# Patient Record
Sex: FEMALE | Race: OTHER | NOT HISPANIC OR LATINO | Employment: UNEMPLOYED | URBAN - METROPOLITAN AREA
[De-identification: names, ages, dates, MRNs, and addresses within clinical notes are randomized per-mention and may not be internally consistent; named-entity substitution may affect disease eponyms.]

---

## 2022-03-22 DIAGNOSIS — Z86.39 HISTORY OF DIABETES MELLITUS, TYPE II: ICD-10-CM

## 2022-03-22 DIAGNOSIS — Z87.59 HISTORY OF PRE-ECLAMPSIA: ICD-10-CM

## 2022-03-22 DIAGNOSIS — Z86.018 HISTORY OF UTERINE FIBROID: Primary | ICD-10-CM

## 2022-04-28 ENCOUNTER — TELEPHONE (OUTPATIENT)
Dept: MATERNAL FETAL MEDICINE | Facility: CLINIC | Age: 27
End: 2022-04-28
Payer: COMMERCIAL

## 2022-04-28 NOTE — TELEPHONE ENCOUNTER
Attempted to contact number listed. Left vm to call back at  [facility], [place], [place].

## 2022-05-04 ENCOUNTER — PROCEDURE VISIT (OUTPATIENT)
Dept: MATERNAL FETAL MEDICINE | Facility: CLINIC | Age: 27
End: 2022-05-04
Payer: COMMERCIAL

## 2022-05-04 ENCOUNTER — OFFICE VISIT (OUTPATIENT)
Dept: MATERNAL FETAL MEDICINE | Facility: CLINIC | Age: 27
End: 2022-05-04
Payer: COMMERCIAL

## 2022-05-04 ENCOUNTER — PATIENT MESSAGE (OUTPATIENT)
Dept: MATERNAL FETAL MEDICINE | Facility: CLINIC | Age: 27
End: 2022-05-04

## 2022-05-04 VITALS
SYSTOLIC BLOOD PRESSURE: 116 MMHG | BODY MASS INDEX: 32.49 KG/M2 | HEIGHT: 64 IN | WEIGHT: 190.31 LBS | DIASTOLIC BLOOD PRESSURE: 66 MMHG

## 2022-05-04 DIAGNOSIS — Z86.018 HISTORY OF UTERINE FIBROID: ICD-10-CM

## 2022-05-04 DIAGNOSIS — Z86.39 HISTORY OF DIABETES MELLITUS, TYPE II: ICD-10-CM

## 2022-05-04 DIAGNOSIS — O34.10 UTERINE FIBROID IN PREGNANCY: ICD-10-CM

## 2022-05-04 DIAGNOSIS — Z87.59 HISTORY OF PRE-ECLAMPSIA: ICD-10-CM

## 2022-05-04 DIAGNOSIS — O24.111 PRE-EXISTING TYPE 2 DIABETES MELLITUS DURING PREGNANCY IN FIRST TRIMESTER: ICD-10-CM

## 2022-05-04 DIAGNOSIS — D25.9 UTERINE FIBROID IN PREGNANCY: ICD-10-CM

## 2022-05-04 DIAGNOSIS — Z86.39 HISTORY OF DIABETES MELLITUS, TYPE II: Primary | ICD-10-CM

## 2022-05-04 DIAGNOSIS — Z36.89 ENCOUNTER FOR FETAL ANATOMIC SURVEY: ICD-10-CM

## 2022-05-04 PROCEDURE — 76801 OB US < 14 WKS SINGLE FETUS: CPT | Mod: S$GLB,,, | Performed by: OBSTETRICS & GYNECOLOGY

## 2022-05-04 PROCEDURE — 3078F PR MOST RECENT DIASTOLIC BLOOD PRESSURE < 80 MM HG: ICD-10-PCS | Mod: S$GLB,,, | Performed by: OBSTETRICS & GYNECOLOGY

## 2022-05-04 PROCEDURE — 1160F PR REVIEW ALL MEDS BY PRESCRIBER/CLIN PHARMACIST DOCUMENTED: ICD-10-PCS | Mod: S$GLB,,, | Performed by: OBSTETRICS & GYNECOLOGY

## 2022-05-04 PROCEDURE — 1160F RVW MEDS BY RX/DR IN RCRD: CPT | Mod: S$GLB,,, | Performed by: OBSTETRICS & GYNECOLOGY

## 2022-05-04 PROCEDURE — 3074F PR MOST RECENT SYSTOLIC BLOOD PRESSURE < 130 MM HG: ICD-10-PCS | Mod: S$GLB,,, | Performed by: OBSTETRICS & GYNECOLOGY

## 2022-05-04 PROCEDURE — 76801 PR US, OB <14WKS, TRANSABD, SINGLE GESTATION: ICD-10-PCS | Mod: S$GLB,,, | Performed by: OBSTETRICS & GYNECOLOGY

## 2022-05-04 PROCEDURE — 3074F SYST BP LT 130 MM HG: CPT | Mod: S$GLB,,, | Performed by: OBSTETRICS & GYNECOLOGY

## 2022-05-04 PROCEDURE — 3078F DIAST BP <80 MM HG: CPT | Mod: S$GLB,,, | Performed by: OBSTETRICS & GYNECOLOGY

## 2022-05-04 PROCEDURE — 3008F BODY MASS INDEX DOCD: CPT | Mod: S$GLB,,, | Performed by: OBSTETRICS & GYNECOLOGY

## 2022-05-04 PROCEDURE — 99204 OFFICE O/P NEW MOD 45 MIN: CPT | Mod: TH,25,S$GLB, | Performed by: OBSTETRICS & GYNECOLOGY

## 2022-05-04 PROCEDURE — 3008F PR BODY MASS INDEX (BMI) DOCUMENTED: ICD-10-PCS | Mod: S$GLB,,, | Performed by: OBSTETRICS & GYNECOLOGY

## 2022-05-04 PROCEDURE — 99204 PR OFFICE/OUTPT VISIT, NEW, LEVL IV, 45-59 MIN: ICD-10-PCS | Mod: TH,25,S$GLB, | Performed by: OBSTETRICS & GYNECOLOGY

## 2022-05-04 PROCEDURE — 1159F MED LIST DOCD IN RCRD: CPT | Mod: S$GLB,,, | Performed by: OBSTETRICS & GYNECOLOGY

## 2022-05-04 PROCEDURE — 1159F PR MEDICATION LIST DOCUMENTED IN MEDICAL RECORD: ICD-10-PCS | Mod: S$GLB,,, | Performed by: OBSTETRICS & GYNECOLOGY

## 2022-05-04 RX ORDER — INSULIN GLARGINE 100 [IU]/ML
INJECTION, SOLUTION SUBCUTANEOUS
COMMUNITY
Start: 2021-11-05 | End: 2022-06-09 | Stop reason: ALTCHOICE

## 2022-05-04 RX ORDER — ASPIRIN 81 MG/1
81 TABLET ORAL DAILY
COMMUNITY

## 2022-05-04 RX ORDER — INSULIN ASPART 100 [IU]/ML
INJECTION, SOLUTION INTRAVENOUS; SUBCUTANEOUS
COMMUNITY
Start: 2021-10-06 | End: 2022-06-29 | Stop reason: SDUPTHER

## 2022-05-04 RX ORDER — METFORMIN HYDROCHLORIDE 1000 MG/1
1000 TABLET ORAL 2 TIMES DAILY
COMMUNITY
Start: 2021-11-03 | End: 2022-09-14

## 2022-05-04 NOTE — PROGRESS NOTES
Chief complaint: Diabetes complicating pregnancy, fibroids    Provider requesting consultation: Dr. Guillory    26 y.o. [id number] 11w5d EGA.    PMH:  Past Medical History:   Diagnosis Date    Type 2 diabetes mellitus, without long-term current use of insulin     Unspecified pre-eclampsia, unspecified trimester        PObHx:  OB History    Para Term  AB Living   2 1 1     1   SAB IAB Ectopic Multiple Live Births           1      # Outcome Date GA Lbr Andrade/2nd Weight Sex Delivery Anes PTL Lv   2 Current            1 Term 04/15/19 37w0d   M CS-Unspec   KARMEN      Complications: Fetal Intolerance       PSH:  Past Surgical History:   Procedure Laterality Date     SECTION         Family history:family history is not on file.    Social history: reports that she has never smoked. She has never used smokeless tobacco. She reports previous alcohol use. She reports that she does not use drugs.    A detailed fetal  ultrasound was completed today.  See details in imaging section of EPIC.    Pregestational Diabetes  The patient was counseled regarding the risks of diabetes in pregnancy. These risks include but are not limited to an increased risk of , preeclampsia/gestational hypertension, fetal structural anomalies, macrosomia, prematurity, shoulder dystocia/birth injury,  hyperbilirubinemia and electrolyte issues, pulmonary immaturity and sudden stillbirth especially in those patients with poor glucose control. I also discussed with the patient the need for increased fetal surveillance with serial fetal growth assessments and third trimester fetal testing. I also reviewed the possibility of need for early delivery in those with poor glucose control or evidence of fetal growth abnormalities.    Recommendations (Please refer to [facility] guidelines):   Baseline evaluation (to be ordered by primary OB provider):  o 24 hour urine protein or urine P/C ratio, CBC, CMP  o Maternal EKG;  echocardiogram if BMI > 30 or EKG is abnormal  o Maternal ophthalmic exam (if hasn't been performed in last year) and podiatry exam  o TSH (if type 1 DM)  o Hemoglobin A1C every trimester  o Diabetic education referral and counseling re: goal blood sugars (< 95 mg/dl for fasting, < 120 mg/dl for 2 hour postprandial)   Medications:   o Start low dose aspirin 81 mg daily at 12-16 weeks for preeclampsia risk reduction  o 1 mg folic acid daily  o Insulin/Metformin-see below   Multiple dose injectable insulin  o Patient's current regimen is:  - Metformin 1000 mg bid  - NPH/Levemir/glargine/basaglar 30 units Q AM, 15 units Q PM.  - Aspart/Lispro none  o After consultation, as she did not bring a glucose log, no adjustments were made:   Ultrasounds:  o Nuchal translucency scan at 12-13 weeks  o Targeted anatomy survey at 20 weeks  o Serial growth ultrasounds q 4-6 weeks at 26-28 weeks  o A fetal echocardiogram is recommended at 22-24 weeks with pediatric cardiology   Prenatal testing  o Twice weekly BPP/ NST + AFV starting at 32 weeks. (Should be ordered and arranged by the patient's primary OB provider).     Delivery timing:  o 38 0/7 to 38 and 6/7 weeks if under good control without comorbidities  o 37 0/7 to 37 and 67 weeks if longstanding diabetes or poorly controlled, polyhydramnios, EFW>90th percentile, or BMI >= 40  o 36 0/7 to 37 and 6/7 weeks if vascular complications, prior stillbirth or other complicating conditions.  Recommend consideration of earlier delivery if IUGR, HTN, or other complications  Recommend offering  for delivery is EFW is 4500g or more near the time of delivery     Insulin management during labor and delivery  o Give usual dose of intermediate acting (NPH) or long-acting insulin at bedtime  o Hold morning dose of insulin or reduce to ½ dose   o Patients who require insulin should be scheduled as the first available (7 am or 7:30 am)  given the need for NPO  status  o Check glucose every 2-4 hours in latent labor; hourly in active labor  o If blood glucose is less than 70 mg/dl, change IVF to D5 ½ NS at rate of 100-150 cc/hr and monitor blood glucose hourly   o IV infusion of regular insulin is recommended if glucose levels exceed 120 mg/dl  o Type 1 diabetics require an infusion of glucose (D5 ½ NS) and insulin to avoid ketosis and hypoglycemia; the insulin infusion can be temporarily paused if hypoglycemia is noted and additional dextrose (D10 or ½ amp of D50) can be administered  o Patients who are well controlled with an insulin pump can continue during labor and delivery. Recommend primary OB ensure pump site is out of operative field and confirm pump is compatible with and able to be left on during surgery.   Postpartum management  o Insulin should be reduced by 1/2 of the pre-delivery dose within the first 6-24 hours following delivery.  o Patients who were well-controlled on oral regimens can often return to these following delivery.  o Patients who are breastfeeding should be advised to have small snacks before breastfeeding to reduce the risk of hypoglycemia.  o Patients should be referred to primary MD or Endocrinology for postpartum management.    The patient was counseled about fibroids in pregnancy.  The patient was counseled that fibroids may remain stable or increase in size.  The patient was counseled that some fibroids may undergo degeneration and cause severe abdominal pain and require hospitalization for pain control.  The patient was counseled that large fibroids may be associated with obstruction to labor or fetal malpresentation.  The patient was counseled that retroplacental fibroids may be associated with fetal growth restriction (FGR).  I recommend that the patient have serial fetal growth ultrasounds every 4 weeks, starting at approximately 28-32 weeks.     The patient was given an opportunity to ask questions about management and the diease  process.  She expressed an understanding of and agreement to the above impression and plan. All questions were answered to her satisfaction.  She was given contact information to the [facility] clinic to address further concerns.      I spent a total of 30 minutes on the day of the visit. This includes face to face time and non-face to face time preparing to see the patient (eg, review of tests), Obtaining and/or reviewing separately obtained history, Documenting clinical information in the electronic or other health record, Independently interpreting results and communicating results to the patient/family/caregiver, or Care coordination.

## 2022-05-05 ENCOUNTER — TELEPHONE (OUTPATIENT)
Dept: DIABETES | Facility: CLINIC | Age: 27
End: 2022-05-05
Payer: COMMERCIAL

## 2022-05-11 ENCOUNTER — PATIENT MESSAGE (OUTPATIENT)
Dept: DIABETES | Facility: CLINIC | Age: 27
End: 2022-05-11
Payer: COMMERCIAL

## 2022-05-12 ENCOUNTER — TELEPHONE (OUTPATIENT)
Dept: MATERNAL FETAL MEDICINE | Facility: CLINIC | Age: 27
End: 2022-05-12
Payer: COMMERCIAL

## 2022-05-12 NOTE — TELEPHONE ENCOUNTER
Patient called yesterday to reschedule appt to next Wednesday. Requested pt to send in recent blood sugar log through patient portal.

## 2022-05-18 ENCOUNTER — LAB VISIT (OUTPATIENT)
Dept: LAB | Facility: CLINIC | Age: 27
End: 2022-05-18
Payer: MEDICAID

## 2022-05-18 ENCOUNTER — OFFICE VISIT (OUTPATIENT)
Dept: MATERNAL FETAL MEDICINE | Facility: CLINIC | Age: 27
End: 2022-05-18
Payer: MEDICAID

## 2022-05-18 VITALS
BODY MASS INDEX: 33.15 KG/M2 | WEIGHT: 193.13 LBS | DIASTOLIC BLOOD PRESSURE: 64 MMHG | SYSTOLIC BLOOD PRESSURE: 124 MMHG

## 2022-05-18 DIAGNOSIS — O24.111 PRE-EXISTING TYPE 2 DIABETES MELLITUS DURING PREGNANCY IN FIRST TRIMESTER: Primary | ICD-10-CM

## 2022-05-18 DIAGNOSIS — O24.111 PRE-EXISTING TYPE 2 DIABETES MELLITUS DURING PREGNANCY IN FIRST TRIMESTER: ICD-10-CM

## 2022-05-18 LAB
ESTIMATED AVG GLUCOSE: 131 MG/DL (ref 68–131)
HBA1C MFR BLD: 6.2 % (ref 4–5.6)

## 2022-05-18 PROCEDURE — 36415 PR COLLECTION VENOUS BLOOD,VENIPUNCTURE: ICD-10-PCS | Mod: ,,, | Performed by: OBSTETRICS & GYNECOLOGY

## 2022-05-18 PROCEDURE — 3008F PR BODY MASS INDEX (BMI) DOCUMENTED: ICD-10-PCS | Mod: S$GLB,,, | Performed by: OBSTETRICS & GYNECOLOGY

## 2022-05-18 PROCEDURE — 3008F BODY MASS INDEX DOCD: CPT | Mod: S$GLB,,, | Performed by: OBSTETRICS & GYNECOLOGY

## 2022-05-18 PROCEDURE — 99213 PR OFFICE/OUTPT VISIT, EST, LEVL III, 20-29 MIN: ICD-10-PCS | Mod: TH,S$GLB,, | Performed by: OBSTETRICS & GYNECOLOGY

## 2022-05-18 PROCEDURE — 1159F PR MEDICATION LIST DOCUMENTED IN MEDICAL RECORD: ICD-10-PCS | Mod: S$GLB,,, | Performed by: OBSTETRICS & GYNECOLOGY

## 2022-05-18 PROCEDURE — 1159F MED LIST DOCD IN RCRD: CPT | Mod: S$GLB,,, | Performed by: OBSTETRICS & GYNECOLOGY

## 2022-05-18 PROCEDURE — 1160F RVW MEDS BY RX/DR IN RCRD: CPT | Mod: S$GLB,,, | Performed by: OBSTETRICS & GYNECOLOGY

## 2022-05-18 PROCEDURE — 99213 OFFICE O/P EST LOW 20 MIN: CPT | Mod: TH,S$GLB,, | Performed by: OBSTETRICS & GYNECOLOGY

## 2022-05-18 PROCEDURE — 3078F PR MOST RECENT DIASTOLIC BLOOD PRESSURE < 80 MM HG: ICD-10-PCS | Mod: S$GLB,,, | Performed by: OBSTETRICS & GYNECOLOGY

## 2022-05-18 PROCEDURE — 3074F PR MOST RECENT SYSTOLIC BLOOD PRESSURE < 130 MM HG: ICD-10-PCS | Mod: S$GLB,,, | Performed by: OBSTETRICS & GYNECOLOGY

## 2022-05-18 PROCEDURE — 36415 COLL VENOUS BLD VENIPUNCTURE: CPT | Mod: ,,, | Performed by: OBSTETRICS & GYNECOLOGY

## 2022-05-18 PROCEDURE — 83036 HEMOGLOBIN GLYCOSYLATED A1C: CPT | Performed by: OBSTETRICS & GYNECOLOGY

## 2022-05-18 PROCEDURE — 3078F DIAST BP <80 MM HG: CPT | Mod: S$GLB,,, | Performed by: OBSTETRICS & GYNECOLOGY

## 2022-05-18 PROCEDURE — 3074F SYST BP LT 130 MM HG: CPT | Mod: S$GLB,,, | Performed by: OBSTETRICS & GYNECOLOGY

## 2022-05-18 PROCEDURE — 1160F PR REVIEW ALL MEDS BY PRESCRIBER/CLIN PHARMACIST DOCUMENTED: ICD-10-PCS | Mod: S$GLB,,, | Performed by: OBSTETRICS & GYNECOLOGY

## 2022-05-18 NOTE — PROGRESS NOTES
Follow up consultation regarding diabetes in pregnancy provided today.  Risks of uncontrolled diabetes in pregnancy reviewed once again.  Blood glucose measurements assessed.  The patient has not recorded any post dinner sugars.  All of her other values are well within target range.  I did not adjust her medications today.  She will send sugars through the portal next week and I ordered a hemoglobin A1c on today's visit.    The patient was given an opportunity to ask questions about management and the diease process.  She expressed an understanding of and agreement to the above impression and plan. All questions were answered to her satisfaction.  She was given contact information to the [facility] clinic to address further concerns.      I spent a total of 20 minutes on the day of the visit. This includes face to face time and non-face to face time preparing to see the patient (eg, review of tests), Obtaining and/or reviewing separately obtained history, Documenting clinical information in the electronic or other health record, Independently interpreting results and communicating results to the patient/family/caregiver, or Care coordination.

## 2022-05-27 ENCOUNTER — TELEPHONE (OUTPATIENT)
Dept: PEDIATRIC CARDIOLOGY | Facility: CLINIC | Age: 27
End: 2022-05-27
Payer: COMMERCIAL

## 2022-05-27 ENCOUNTER — PATIENT MESSAGE (OUTPATIENT)
Dept: PEDIATRIC CARDIOLOGY | Facility: CLINIC | Age: 27
End: 2022-05-27
Payer: COMMERCIAL

## 2022-05-27 NOTE — TELEPHONE ENCOUNTER
Left message for patient to call clinic about having to move the fetal echo due to a clinic closer in [place]

## 2022-06-08 ENCOUNTER — OFFICE VISIT (OUTPATIENT)
Dept: MATERNAL FETAL MEDICINE | Facility: CLINIC | Age: 27
End: 2022-06-08
Payer: MEDICAID

## 2022-06-08 VITALS
SYSTOLIC BLOOD PRESSURE: 134 MMHG | HEIGHT: 64 IN | DIASTOLIC BLOOD PRESSURE: 93 MMHG | WEIGHT: 200.63 LBS | BODY MASS INDEX: 34.25 KG/M2

## 2022-06-08 DIAGNOSIS — O24.112 PRE-EXISTING TYPE 2 DIABETES MELLITUS DURING PREGNANCY IN SECOND TRIMESTER: Primary | ICD-10-CM

## 2022-06-08 PROCEDURE — 1159F PR MEDICATION LIST DOCUMENTED IN MEDICAL RECORD: ICD-10-PCS | Mod: S$GLB,,, | Performed by: OBSTETRICS & GYNECOLOGY

## 2022-06-08 PROCEDURE — 3075F SYST BP GE 130 - 139MM HG: CPT | Mod: S$GLB,,, | Performed by: OBSTETRICS & GYNECOLOGY

## 2022-06-08 PROCEDURE — 3080F PR MOST RECENT DIASTOLIC BLOOD PRESSURE >= 90 MM HG: ICD-10-PCS | Mod: S$GLB,,, | Performed by: OBSTETRICS & GYNECOLOGY

## 2022-06-08 PROCEDURE — 3008F BODY MASS INDEX DOCD: CPT | Mod: S$GLB,,, | Performed by: OBSTETRICS & GYNECOLOGY

## 2022-06-08 PROCEDURE — 3044F HG A1C LEVEL LT 7.0%: CPT | Mod: S$GLB,,, | Performed by: OBSTETRICS & GYNECOLOGY

## 2022-06-08 PROCEDURE — 3080F DIAST BP >= 90 MM HG: CPT | Mod: S$GLB,,, | Performed by: OBSTETRICS & GYNECOLOGY

## 2022-06-08 PROCEDURE — 3044F PR MOST RECENT HEMOGLOBIN A1C LEVEL <7.0%: ICD-10-PCS | Mod: S$GLB,,, | Performed by: OBSTETRICS & GYNECOLOGY

## 2022-06-08 PROCEDURE — 3075F PR MOST RECENT SYSTOLIC BLOOD PRESS GE 130-139MM HG: ICD-10-PCS | Mod: S$GLB,,, | Performed by: OBSTETRICS & GYNECOLOGY

## 2022-06-08 PROCEDURE — 1159F MED LIST DOCD IN RCRD: CPT | Mod: S$GLB,,, | Performed by: OBSTETRICS & GYNECOLOGY

## 2022-06-08 PROCEDURE — 99213 PR OFFICE/OUTPT VISIT, EST, LEVL III, 20-29 MIN: ICD-10-PCS | Mod: TH,S$GLB,, | Performed by: OBSTETRICS & GYNECOLOGY

## 2022-06-08 PROCEDURE — 99213 OFFICE O/P EST LOW 20 MIN: CPT | Mod: TH,S$GLB,, | Performed by: OBSTETRICS & GYNECOLOGY

## 2022-06-08 PROCEDURE — 3008F PR BODY MASS INDEX (BMI) DOCUMENTED: ICD-10-PCS | Mod: S$GLB,,, | Performed by: OBSTETRICS & GYNECOLOGY

## 2022-06-08 RX ORDER — HUMAN INSULIN 100 [IU]/ML
INJECTION, SUSPENSION SUBCUTANEOUS
Qty: 14.1 ML | Refills: 0 | Status: CANCELLED | OUTPATIENT
Start: 2022-06-08 | End: 2022-07-08

## 2022-06-08 NOTE — PROGRESS NOTES
"Maternal Fetal Medicine follow up consult    SUBJECTIVE:     Lucy Puckett is a 26 y.o.  female with IUP at 16w5 dwho is seen in follow up consultation by MFM.  Pregnancy complications include:   Type II Diabetes in Pregnancy  Previous notes reviewed.   No changes to medical, surgical, family, social, or obstetric history.    Interval history since last MFM visit: denies complications     Medications:  Current Outpatient Medications   Medication Instructions    aspirin (ECOTRIN) 81 mg, Oral, Daily    insulin (LANTUS SOLOSTAR U-100 INSULIN) glargine 100 units/mL (3mL) SubQ pen Takes 30 units in am    insulin aspart U-100 (NOVOLOG) 100 unit/mL injection Takes 15 units at lunch    metFORMIN (GLUCOPHAGE) 1,000 mg, Oral, 2 times daily       Care team members:  Dr Guillory - Primary OB  MFM     OBJECTIVE:   BP (!) 134/93 (BP Location: Right arm, Patient Position: Sitting, BP Method: Medium (Automatic))   Ht 5' 4" (1.626 m)   Wt 91 kg (200 lb 9.9 oz)   BMI 34.44 kg/m²   Physical Exam   FHTs 140s with doppler           ASSESSMENT/PLAN:     26 y.o.  female with IUP at 16w5d    PreGest DM  BS check  PBS reviewed.  FBS ,  pBF 116-117 , pL 120 (single value)  Patient is not checking BS 4X daily.  Reviewed BS goals and need for checking both FBS and PP  Current Meds: NPH 30 units AM, 15 units PM  Recommended adjustment:  NPH 30 AM, 17 PM                                                 Continue metformin 1000 mg BID     Patient to send BS in every week via Portal  Has return appointment with us 22.  Discussed importance of compliance with therapy and with PBS checks.   Patient left clinic before BP was rechecked; we called to ask that she have it repeated in next 1-3 days at her pharmacy.       20 minutes of total time spent on the encounter, which includes face to face time and non-face to face time preparing to see the patient (eg, review of tests), obtaining and/or reviewing separately obtained " history, documenting clinical information in the electronic or other health record, independently interpreting results (not separately reported) and communicating results to the patient/family/caregiver, or care coordination (not separately reported).

## 2022-06-09 RX ORDER — INSULIN HUMAN 100 [IU]/ML
INJECTION, SUSPENSION SUBCUTANEOUS
Qty: 10 ML | Refills: 3 | Status: SHIPPED | OUTPATIENT
Start: 2022-06-09 | End: 2024-02-21 | Stop reason: ALTCHOICE

## 2022-06-29 ENCOUNTER — OFFICE VISIT (OUTPATIENT)
Dept: MATERNAL FETAL MEDICINE | Facility: CLINIC | Age: 27
End: 2022-06-29
Payer: COMMERCIAL

## 2022-06-29 ENCOUNTER — PATIENT MESSAGE (OUTPATIENT)
Dept: MATERNAL FETAL MEDICINE | Facility: CLINIC | Age: 27
End: 2022-06-29

## 2022-06-29 ENCOUNTER — PROCEDURE VISIT (OUTPATIENT)
Dept: MATERNAL FETAL MEDICINE | Facility: CLINIC | Age: 27
End: 2022-06-29
Payer: COMMERCIAL

## 2022-06-29 VITALS
BODY MASS INDEX: 35.02 KG/M2 | SYSTOLIC BLOOD PRESSURE: 131 MMHG | WEIGHT: 204.06 LBS | DIASTOLIC BLOOD PRESSURE: 72 MMHG

## 2022-06-29 DIAGNOSIS — Z36.89 ENCOUNTER FOR FETAL ANATOMIC SURVEY: ICD-10-CM

## 2022-06-29 DIAGNOSIS — Z36.89 ENCOUNTER FOR ULTRASOUND TO ASSESS FETAL GROWTH: Primary | ICD-10-CM

## 2022-06-29 DIAGNOSIS — Z86.39 HISTORY OF DIABETES MELLITUS, TYPE II: Primary | ICD-10-CM

## 2022-06-29 DIAGNOSIS — Z86.39 HISTORY OF DIABETES MELLITUS, TYPE II: ICD-10-CM

## 2022-06-29 PROCEDURE — 3044F PR MOST RECENT HEMOGLOBIN A1C LEVEL <7.0%: ICD-10-PCS | Mod: S$GLB,,, | Performed by: OBSTETRICS & GYNECOLOGY

## 2022-06-29 PROCEDURE — 1159F MED LIST DOCD IN RCRD: CPT | Mod: S$GLB,,, | Performed by: OBSTETRICS & GYNECOLOGY

## 2022-06-29 PROCEDURE — 1159F PR MEDICATION LIST DOCUMENTED IN MEDICAL RECORD: ICD-10-PCS | Mod: S$GLB,,, | Performed by: OBSTETRICS & GYNECOLOGY

## 2022-06-29 PROCEDURE — 99215 PR OFFICE/OUTPT VISIT, EST, LEVL V, 40-54 MIN: ICD-10-PCS | Mod: TH,25,S$GLB, | Performed by: OBSTETRICS & GYNECOLOGY

## 2022-06-29 PROCEDURE — 3008F PR BODY MASS INDEX (BMI) DOCUMENTED: ICD-10-PCS | Mod: S$GLB,,, | Performed by: OBSTETRICS & GYNECOLOGY

## 2022-06-29 PROCEDURE — 3075F PR MOST RECENT SYSTOLIC BLOOD PRESS GE 130-139MM HG: ICD-10-PCS | Mod: S$GLB,,, | Performed by: OBSTETRICS & GYNECOLOGY

## 2022-06-29 PROCEDURE — 3044F HG A1C LEVEL LT 7.0%: CPT | Mod: S$GLB,,, | Performed by: OBSTETRICS & GYNECOLOGY

## 2022-06-29 PROCEDURE — 3075F SYST BP GE 130 - 139MM HG: CPT | Mod: S$GLB,,, | Performed by: OBSTETRICS & GYNECOLOGY

## 2022-06-29 PROCEDURE — 76811 OB US DETAILED SNGL FETUS: CPT | Mod: S$GLB,,, | Performed by: OBSTETRICS & GYNECOLOGY

## 2022-06-29 PROCEDURE — 1160F PR REVIEW ALL MEDS BY PRESCRIBER/CLIN PHARMACIST DOCUMENTED: ICD-10-PCS | Mod: S$GLB,,, | Performed by: OBSTETRICS & GYNECOLOGY

## 2022-06-29 PROCEDURE — 76811 PR US, OB FETAL EVAL & EXAM, TRANSABDOM,FIRST GESTATION: ICD-10-PCS | Mod: S$GLB,,, | Performed by: OBSTETRICS & GYNECOLOGY

## 2022-06-29 PROCEDURE — 3008F BODY MASS INDEX DOCD: CPT | Mod: S$GLB,,, | Performed by: OBSTETRICS & GYNECOLOGY

## 2022-06-29 PROCEDURE — 99215 OFFICE O/P EST HI 40 MIN: CPT | Mod: TH,25,S$GLB, | Performed by: OBSTETRICS & GYNECOLOGY

## 2022-06-29 PROCEDURE — 1160F RVW MEDS BY RX/DR IN RCRD: CPT | Mod: S$GLB,,, | Performed by: OBSTETRICS & GYNECOLOGY

## 2022-06-29 PROCEDURE — 3078F DIAST BP <80 MM HG: CPT | Mod: S$GLB,,, | Performed by: OBSTETRICS & GYNECOLOGY

## 2022-06-29 PROCEDURE — 3078F PR MOST RECENT DIASTOLIC BLOOD PRESSURE < 80 MM HG: ICD-10-PCS | Mod: S$GLB,,, | Performed by: OBSTETRICS & GYNECOLOGY

## 2022-06-29 RX ORDER — INSULIN ASPART 100 [IU]/ML
30 INJECTION, SOLUTION INTRAVENOUS; SUBCUTANEOUS 3 TIMES DAILY
Qty: 30 ML | Refills: 11 | Status: SHIPPED | OUTPATIENT
Start: 2022-06-29 | End: 2024-02-21

## 2022-06-29 NOTE — PROGRESS NOTES
Follow up consultation regarding diabetes in pregnancy provided today.  Risks of uncontrolled diabetes in pregnancy reviewed once again.  Blood glucose measurements assessed.  Her insulin regimen is completely messed up.  She thought that she was on novel in 30 units in the morning and 17 units at lunch.  When questioned she states that the bottle is clear.  She showed me a bottle of novel an end that she was originally on which I could see was cloudy but said that the pharmacy was out of that and gave her the clear.  She also takes metformin a 1000 mg twice daily.  She will send us a picture of the bottle that she is presently taking, this is either novolin R or NovoLog.  I will continue her short-acting insulin and change her to 30 units with breakfast, 26 units with lunch and 26 units with dinner.  Since her fastings are in range I will not change her metformin and not add a long-acting insulin at bedtime.  This is been rather confusing and took a while to actually figure out.    The patient was given an opportunity to ask questions about management and the diease process.  She expressed an understanding of and agreement to the above impression and plan. All questions were answered to her satisfaction.  She was given contact information to the [facility] clinic to address further concerns.      I spent a total of 40 minutes on the day of the visit. This includes face to face time and non-face to face time preparing to see the patient (eg, review of tests), Obtaining and/or reviewing separately obtained history, Documenting clinical information in the electronic or other health record, Independently interpreting results and communicating results to the patient/family/caregiver, or Care coordination.

## 2022-07-13 ENCOUNTER — PATIENT MESSAGE (OUTPATIENT)
Dept: MATERNAL FETAL MEDICINE | Facility: CLINIC | Age: 27
End: 2022-07-13

## 2022-07-13 ENCOUNTER — DOCUMENTATION ONLY (OUTPATIENT)
Dept: MATERNAL FETAL MEDICINE | Facility: CLINIC | Age: 27
End: 2022-07-13
Payer: COMMERCIAL

## 2022-07-13 NOTE — PROGRESS NOTES
Blood glucose log reviewed from 6/30-7/13. Plan to titrate lunch time aspart to 28 units due to mildly elevated lunch post prandial values.           Fasting: elevated 3/14  PP Breakfast: elevated 1/13  PP Lunch: elevated 11/13  PP Dinner: elevated 6/13    Current regimen:   -Aspart 30/26/26  -Metformin 1000 mg BID    New regimen:  -Aspart 30/28/26  -Metformin 1000 mg BID    MG Thalia  PGY5  Maternal Fetal Medicine

## 2022-07-20 ENCOUNTER — OFFICE VISIT (OUTPATIENT)
Dept: MATERNAL FETAL MEDICINE | Facility: CLINIC | Age: 27
End: 2022-07-20
Payer: COMMERCIAL

## 2022-07-20 VITALS
DIASTOLIC BLOOD PRESSURE: 72 MMHG | BODY MASS INDEX: 33.89 KG/M2 | SYSTOLIC BLOOD PRESSURE: 126 MMHG | WEIGHT: 197.44 LBS

## 2022-07-20 DIAGNOSIS — O24.112 PRE-EXISTING TYPE 2 DIABETES MELLITUS DURING PREGNANCY IN SECOND TRIMESTER: Primary | ICD-10-CM

## 2022-07-20 PROCEDURE — 3008F BODY MASS INDEX DOCD: CPT | Mod: S$GLB,,, | Performed by: OBSTETRICS & GYNECOLOGY

## 2022-07-20 PROCEDURE — 3074F PR MOST RECENT SYSTOLIC BLOOD PRESSURE < 130 MM HG: ICD-10-PCS | Mod: S$GLB,,, | Performed by: OBSTETRICS & GYNECOLOGY

## 2022-07-20 PROCEDURE — 1160F PR REVIEW ALL MEDS BY PRESCRIBER/CLIN PHARMACIST DOCUMENTED: ICD-10-PCS | Mod: S$GLB,,, | Performed by: OBSTETRICS & GYNECOLOGY

## 2022-07-20 PROCEDURE — 99213 PR OFFICE/OUTPT VISIT, EST, LEVL III, 20-29 MIN: ICD-10-PCS | Mod: TH,S$GLB,, | Performed by: OBSTETRICS & GYNECOLOGY

## 2022-07-20 PROCEDURE — 1160F RVW MEDS BY RX/DR IN RCRD: CPT | Mod: S$GLB,,, | Performed by: OBSTETRICS & GYNECOLOGY

## 2022-07-20 PROCEDURE — 99213 OFFICE O/P EST LOW 20 MIN: CPT | Mod: TH,S$GLB,, | Performed by: OBSTETRICS & GYNECOLOGY

## 2022-07-20 PROCEDURE — 3074F SYST BP LT 130 MM HG: CPT | Mod: S$GLB,,, | Performed by: OBSTETRICS & GYNECOLOGY

## 2022-07-20 PROCEDURE — 3044F HG A1C LEVEL LT 7.0%: CPT | Mod: S$GLB,,, | Performed by: OBSTETRICS & GYNECOLOGY

## 2022-07-20 PROCEDURE — 3078F PR MOST RECENT DIASTOLIC BLOOD PRESSURE < 80 MM HG: ICD-10-PCS | Mod: S$GLB,,, | Performed by: OBSTETRICS & GYNECOLOGY

## 2022-07-20 PROCEDURE — 3044F PR MOST RECENT HEMOGLOBIN A1C LEVEL <7.0%: ICD-10-PCS | Mod: S$GLB,,, | Performed by: OBSTETRICS & GYNECOLOGY

## 2022-07-20 PROCEDURE — 3008F PR BODY MASS INDEX (BMI) DOCUMENTED: ICD-10-PCS | Mod: S$GLB,,, | Performed by: OBSTETRICS & GYNECOLOGY

## 2022-07-20 PROCEDURE — 1159F PR MEDICATION LIST DOCUMENTED IN MEDICAL RECORD: ICD-10-PCS | Mod: S$GLB,,, | Performed by: OBSTETRICS & GYNECOLOGY

## 2022-07-20 PROCEDURE — 3078F DIAST BP <80 MM HG: CPT | Mod: S$GLB,,, | Performed by: OBSTETRICS & GYNECOLOGY

## 2022-07-20 PROCEDURE — 1159F MED LIST DOCD IN RCRD: CPT | Mod: S$GLB,,, | Performed by: OBSTETRICS & GYNECOLOGY

## 2022-07-20 NOTE — PROGRESS NOTES
Follow up consultation regarding diabetes in pregnancy provided today.  Risks of uncontrolled diabetes in pregnancy reviewed once again.  Blood glucose measurements assessed. She is under better control.  Her lunches are elevated  >50%.  I did adjust her medications today.  I changed her Novolog to 30 units with each meal.    The patient was given an opportunity to ask questions about management and the diease process.  She expressed an understanding of and agreement to the above impression and plan. All questions were answered to her satisfaction.  She was given contact information to the [facility] clinic to address further concerns.      I spent a total of 20 minutes on the day of the visit. This includes face to face time and non-face to face time preparing to see the patient (eg, review of tests), Obtaining and/or reviewing separately obtained history, Documenting clinical information in the electronic or other health record, Independently interpreting results and communicating results to the patient/family/caregiver, or Care coordination.

## 2022-08-08 ENCOUNTER — PATIENT MESSAGE (OUTPATIENT)
Dept: MATERNAL FETAL MEDICINE | Facility: CLINIC | Age: 27
End: 2022-08-08
Payer: COMMERCIAL

## 2022-08-10 ENCOUNTER — OFFICE VISIT (OUTPATIENT)
Dept: MATERNAL FETAL MEDICINE | Facility: CLINIC | Age: 27
End: 2022-08-10
Payer: MEDICAID

## 2022-08-10 VITALS
SYSTOLIC BLOOD PRESSURE: 118 MMHG | DIASTOLIC BLOOD PRESSURE: 55 MMHG | BODY MASS INDEX: 34.87 KG/M2 | WEIGHT: 203.13 LBS

## 2022-08-10 DIAGNOSIS — O24.112 PRE-EXISTING TYPE 2 DIABETES MELLITUS DURING PREGNANCY IN SECOND TRIMESTER: Primary | ICD-10-CM

## 2022-08-10 PROCEDURE — 99214 PR OFFICE/OUTPT VISIT, EST, LEVL IV, 30-39 MIN: ICD-10-PCS | Mod: TH,S$GLB,, | Performed by: OBSTETRICS & GYNECOLOGY

## 2022-08-10 PROCEDURE — 3044F HG A1C LEVEL LT 7.0%: CPT | Mod: S$GLB,,, | Performed by: OBSTETRICS & GYNECOLOGY

## 2022-08-10 PROCEDURE — 3008F BODY MASS INDEX DOCD: CPT | Mod: S$GLB,,, | Performed by: OBSTETRICS & GYNECOLOGY

## 2022-08-10 PROCEDURE — 1159F PR MEDICATION LIST DOCUMENTED IN MEDICAL RECORD: ICD-10-PCS | Mod: S$GLB,,, | Performed by: OBSTETRICS & GYNECOLOGY

## 2022-08-10 PROCEDURE — 3074F PR MOST RECENT SYSTOLIC BLOOD PRESSURE < 130 MM HG: ICD-10-PCS | Mod: S$GLB,,, | Performed by: OBSTETRICS & GYNECOLOGY

## 2022-08-10 PROCEDURE — 3044F PR MOST RECENT HEMOGLOBIN A1C LEVEL <7.0%: ICD-10-PCS | Mod: S$GLB,,, | Performed by: OBSTETRICS & GYNECOLOGY

## 2022-08-10 PROCEDURE — 3074F SYST BP LT 130 MM HG: CPT | Mod: S$GLB,,, | Performed by: OBSTETRICS & GYNECOLOGY

## 2022-08-10 PROCEDURE — 99214 OFFICE O/P EST MOD 30 MIN: CPT | Mod: TH,S$GLB,, | Performed by: OBSTETRICS & GYNECOLOGY

## 2022-08-10 PROCEDURE — 1159F MED LIST DOCD IN RCRD: CPT | Mod: S$GLB,,, | Performed by: OBSTETRICS & GYNECOLOGY

## 2022-08-10 PROCEDURE — 3078F DIAST BP <80 MM HG: CPT | Mod: S$GLB,,, | Performed by: OBSTETRICS & GYNECOLOGY

## 2022-08-10 PROCEDURE — 3008F PR BODY MASS INDEX (BMI) DOCUMENTED: ICD-10-PCS | Mod: S$GLB,,, | Performed by: OBSTETRICS & GYNECOLOGY

## 2022-08-10 PROCEDURE — 3078F PR MOST RECENT DIASTOLIC BLOOD PRESSURE < 80 MM HG: ICD-10-PCS | Mod: S$GLB,,, | Performed by: OBSTETRICS & GYNECOLOGY

## 2022-08-10 NOTE — PROGRESS NOTES
Follow up consultation regarding diabetes in pregnancy provided today.  Risks of uncontrolled diabetes in pregnancy reviewed once again.  Blood glucose measurements assessed.   She was recently discharged from the hospital for diabetic ketoacidosis.  Interestingly, her blood sugars prior to that admission were under good control.  She is not sure why they began to go up although she does state that she took 2 glasses of orange juice the day before.  We only have 2 days of blood sugars since her discharge for this reason no change was made today.  She is presently taking 16 units of aspart with breakfast lunch and dinner 24 units of and in the morning and 24 units at bedtime.  The patient was given an opportunity to ask questions about management and the diease process.  She expressed an understanding of and agreement to the above impression and plan. All questions were answered to her satisfaction.  She was given contact information to the [facility] clinic to address further concerns.      I spent a total of 30 minutes on the day of the visit. This includes face to face time and non-face to face time preparing to see the patient (eg, review of tests), Obtaining and/or reviewing separately obtained history, Documenting clinical information in the electronic or other health record, Independently interpreting results and communicating results to the patient/family/caregiver, or Care coordination.

## 2022-08-17 ENCOUNTER — PROCEDURE VISIT (OUTPATIENT)
Dept: MATERNAL FETAL MEDICINE | Facility: CLINIC | Age: 27
End: 2022-08-17
Payer: COMMERCIAL

## 2022-08-17 ENCOUNTER — OFFICE VISIT (OUTPATIENT)
Dept: MATERNAL FETAL MEDICINE | Facility: CLINIC | Age: 27
End: 2022-08-17
Payer: COMMERCIAL

## 2022-08-17 VITALS
DIASTOLIC BLOOD PRESSURE: 68 MMHG | BODY MASS INDEX: 34.78 KG/M2 | WEIGHT: 202.63 LBS | SYSTOLIC BLOOD PRESSURE: 134 MMHG

## 2022-08-17 DIAGNOSIS — Z36.89 ENCOUNTER FOR ULTRASOUND TO ASSESS FETAL GROWTH: Primary | ICD-10-CM

## 2022-08-17 DIAGNOSIS — Z36.89 ENCOUNTER FOR ULTRASOUND TO ASSESS FETAL GROWTH: ICD-10-CM

## 2022-08-17 PROCEDURE — 1160F PR REVIEW ALL MEDS BY PRESCRIBER/CLIN PHARMACIST DOCUMENTED: ICD-10-PCS | Mod: S$GLB,,, | Performed by: OBSTETRICS & GYNECOLOGY

## 2022-08-17 PROCEDURE — 1159F PR MEDICATION LIST DOCUMENTED IN MEDICAL RECORD: ICD-10-PCS | Mod: S$GLB,,, | Performed by: OBSTETRICS & GYNECOLOGY

## 2022-08-17 PROCEDURE — 76816 OB US FOLLOW-UP PER FETUS: CPT | Mod: S$GLB,,, | Performed by: OBSTETRICS & GYNECOLOGY

## 2022-08-17 PROCEDURE — 3008F BODY MASS INDEX DOCD: CPT | Mod: S$GLB,,, | Performed by: OBSTETRICS & GYNECOLOGY

## 2022-08-17 PROCEDURE — 1159F MED LIST DOCD IN RCRD: CPT | Mod: S$GLB,,, | Performed by: OBSTETRICS & GYNECOLOGY

## 2022-08-17 PROCEDURE — 3075F PR MOST RECENT SYSTOLIC BLOOD PRESS GE 130-139MM HG: ICD-10-PCS | Mod: S$GLB,,, | Performed by: OBSTETRICS & GYNECOLOGY

## 2022-08-17 PROCEDURE — 3078F DIAST BP <80 MM HG: CPT | Mod: S$GLB,,, | Performed by: OBSTETRICS & GYNECOLOGY

## 2022-08-17 PROCEDURE — 3075F SYST BP GE 130 - 139MM HG: CPT | Mod: S$GLB,,, | Performed by: OBSTETRICS & GYNECOLOGY

## 2022-08-17 PROCEDURE — 99213 OFFICE O/P EST LOW 20 MIN: CPT | Mod: TH,25,S$GLB, | Performed by: OBSTETRICS & GYNECOLOGY

## 2022-08-17 PROCEDURE — 1160F RVW MEDS BY RX/DR IN RCRD: CPT | Mod: S$GLB,,, | Performed by: OBSTETRICS & GYNECOLOGY

## 2022-08-17 PROCEDURE — 3044F PR MOST RECENT HEMOGLOBIN A1C LEVEL <7.0%: ICD-10-PCS | Mod: S$GLB,,, | Performed by: OBSTETRICS & GYNECOLOGY

## 2022-08-17 PROCEDURE — 3078F PR MOST RECENT DIASTOLIC BLOOD PRESSURE < 80 MM HG: ICD-10-PCS | Mod: S$GLB,,, | Performed by: OBSTETRICS & GYNECOLOGY

## 2022-08-17 PROCEDURE — 3044F HG A1C LEVEL LT 7.0%: CPT | Mod: S$GLB,,, | Performed by: OBSTETRICS & GYNECOLOGY

## 2022-08-17 PROCEDURE — 3008F PR BODY MASS INDEX (BMI) DOCUMENTED: ICD-10-PCS | Mod: S$GLB,,, | Performed by: OBSTETRICS & GYNECOLOGY

## 2022-08-17 PROCEDURE — 99213 PR OFFICE/OUTPT VISIT, EST, LEVL III, 20-29 MIN: ICD-10-PCS | Mod: TH,25,S$GLB, | Performed by: OBSTETRICS & GYNECOLOGY

## 2022-08-17 PROCEDURE — 76816 PR  US,PREGNANT UTERUS,F/U,TRANSABD APP: ICD-10-PCS | Mod: S$GLB,,, | Performed by: OBSTETRICS & GYNECOLOGY

## 2022-08-17 NOTE — PROGRESS NOTES
Follow up consultation regarding diabetes in pregnancy provided today.  Risks of uncontrolled diabetes in pregnancy reviewed once again.  Blood glucose measurements assessed.  She is now under very good control.  Several elevated fasting levels are associated with her waking up in the early morning hours and having a small snack.  I did not adjust her medications today.  She remains on 16 units of Humalog with each meal, 24 units of Levemir in the morning and at bedtime.  Results of today's ultrasound discussed with patient.  The patient was given an opportunity to ask questions about management and the diease process.  She expressed an understanding of and agreement to the above impression and plan. All questions were answered to her satisfaction.  She was given contact information to the [facility] clinic to address further concerns.      I spent a total of 20 minutes on the day of the visit. This includes face to face time and non-face to face time preparing to see the patient (eg, review of tests), Obtaining and/or reviewing separately obtained history, Documenting clinical information in the electronic or other health record, Independently interpreting results and communicating results to the patient/family/caregiver, or Care coordination.

## 2022-08-26 ENCOUNTER — CLINICAL SUPPORT (OUTPATIENT)
Dept: PEDIATRIC CARDIOLOGY | Facility: CLINIC | Age: 27
End: 2022-08-26
Attending: OBSTETRICS & GYNECOLOGY
Payer: COMMERCIAL

## 2022-08-26 ENCOUNTER — OFFICE VISIT (OUTPATIENT)
Dept: PEDIATRIC CARDIOLOGY | Facility: CLINIC | Age: 27
End: 2022-08-26
Payer: COMMERCIAL

## 2022-08-26 VITALS
SYSTOLIC BLOOD PRESSURE: 126 MMHG | DIASTOLIC BLOOD PRESSURE: 68 MMHG | BODY MASS INDEX: 35.36 KG/M2 | HEIGHT: 64 IN | WEIGHT: 207.13 LBS | HEART RATE: 105 BPM

## 2022-08-26 DIAGNOSIS — O24.112 PRE-EXISTING TYPE 2 DIABETES MELLITUS DURING PREGNANCY IN SECOND TRIMESTER: Primary | ICD-10-CM

## 2022-08-26 DIAGNOSIS — Z86.39 HISTORY OF DIABETES MELLITUS, TYPE II: ICD-10-CM

## 2022-08-26 PROCEDURE — 93325 DOPPLER ECHO COLOR FLOW MAPG: CPT | Mod: S$GLB,,, | Performed by: PEDIATRICS

## 2022-08-26 PROCEDURE — 76827 PR  SO2 FETAL HEART DOPPLER: ICD-10-PCS | Mod: S$GLB,,, | Performed by: PEDIATRICS

## 2022-08-26 PROCEDURE — 93325 PR DOPPLER COLOR FLOW VELOCITY MAP: ICD-10-PCS | Mod: S$GLB,,, | Performed by: PEDIATRICS

## 2022-08-26 PROCEDURE — 76825 ECHO EXAM OF FETAL HEART: CPT | Mod: S$GLB,,, | Performed by: PEDIATRICS

## 2022-08-26 PROCEDURE — 99203 PR OFFICE/OUTPT VISIT, NEW, LEVL III, 30-44 MIN: ICD-10-PCS | Mod: 25,TH,S$GLB, | Performed by: PEDIATRICS

## 2022-08-26 PROCEDURE — 76825 PR  SO2 FETAL HEART: ICD-10-PCS | Mod: S$GLB,,, | Performed by: PEDIATRICS

## 2022-08-26 PROCEDURE — 76827 ECHO EXAM OF FETAL HEART: CPT | Mod: S$GLB,,, | Performed by: PEDIATRICS

## 2022-08-26 PROCEDURE — 99203 OFFICE O/P NEW LOW 30 MIN: CPT | Mod: 25,TH,S$GLB, | Performed by: PEDIATRICS

## 2022-09-01 NOTE — PROGRESS NOTES
"[facility] - Pediatric Cardiology Fetal Cardiology Clinic    Today, I had the pleasure of evaluating Lucy Puckett who is now 27 y.o. and carrying her second pregnancy at 28 0/7 weeks gestation with an GEORGES of 22. She was referred for evaluation of the fetal heart due to a history of maternal diabetes mellitus.      She is carrying a female fetus, named Torin.      Obstetric History:    .  Her OB care is by Dr. Baron.  Her MFM care is by Dr. Longoria.    Past Medical History:   Diagnosis Date    Type 2 diabetes mellitus, without long-term current use of insulin     Unspecified pre-eclampsia, unspecified trimester          Current Outpatient Medications:     insulin aspart U-100 (NOVOLOG) 100 unit/mL injection, Inject 30 Units into the skin 3 (three) times daily. Takes 15 units at lunch (Patient taking differently: Inject 16 Units into the skin 3 (three) times daily. 16 units at breakfast 16 units at lunch 16 units at dinner), Disp: 30 mL, Rfl: 11    insulin NPH (HUMULIN N NPH U-100 INSULIN) 100 unit/mL injection, Inject 30 Units into the skin before breakfast AND 17 Units every evening. (Patient taking differently: Inject 24 Units into the skin before breakfast AND 24 Units every evening.), Disp: 10 mL, Rfl: 3    insulin syringe-needle,dispos. 1 mL 29 gauge x 1/2" Syrg, 1 Syringe by Misc.(Non-Drug; Combo Route) route 2 (two) times a day., Disp: 100 each, Rfl: 11    aspirin (ECOTRIN) 81 MG EC tablet, Take 81 mg by mouth once daily., Disp: , Rfl:     metFORMIN (GLUCOPHAGE) 1000 MG tablet, Take 1,000 mg by mouth 2 (two) times a day., Disp: , Rfl:     Family History: Negative for congenital heart disease, early coronary artery disease, sudden unexplained death, connective tissues disorders, genetic syndromes, multiple miscarriages or other congenital anomalies.    Social History:  is single.      FETAL ECHOCARDIOGRAM (summary):  Fetal echocardiogram at 28 0/7 weeks gestation " for a history of maternal diabetes mellitus. GEORGES 22.   Normally connected heart.   Normal sinus rhythm.   No ectopy or sustained arrhythmia demonstrated throughout the study.   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   Normal fetal atrial and ductal level shunting.   No ventricular level shunting.   The AV valves appear to be coplanar.   Normal atrioventricular and semilunar valve structure and function.   Normal ductal and aortic arches.   Normal biventricular size and systolic function.   No pericardial effusion.  (Full report in electronic medical record)    Impression:  Single active female fetus at 28 wga.  Overall normal fetal echocardiogram. The AV valves appear to be coplanar, which can be associated with AV canal defects, although I see no ASD or VSD today.  There is also an LSVC which is a variant of normal anatomy.      Todays fetal echocardiogram is normal, within the limitations of fetal echocardiography.  I discussed with her that fetal echocardiography is insufficiently sensitive to rule out all septal defects, anomalies of pulmonary and systemic veins, arch anomalies, and some valvar abnormalities, nor can it ensure that the ductus arteriosus and foramen ovale will spontaneously close.     Recommendations:  Location, timing, and mode of delivery will be determined by the obstetrical team.  She does not require further follow-up in the fetal echocardiography clinic, but I would be happy to see her again if additional questions or concerns arise.    Should there be any concerns about the baby's heart after birth, a post-santiago echocardiogram and cardiology consultation are recommended.     I think the baby should have a post-santiago surface echocardiogram in clinic with Dr. Cuellar to confirm these findings and assess for any evidence of an ASD/VSD.    The above information was discussed in detail including the use of diagrams, with 30 minutes of total face to face time, with  greater than 50% with counseling and coordination of care.  The discussion of the diagnosis and treatment options is as described above.      Bobo Sullivan MD, MPH  Pediatric and Fetal Cardiology  [facility]   [address] [place] [address]    Office: [phone]  Cell: [phone]

## 2022-09-13 ENCOUNTER — PATIENT MESSAGE (OUTPATIENT)
Dept: MATERNAL FETAL MEDICINE | Facility: CLINIC | Age: 27
End: 2022-09-13
Payer: COMMERCIAL

## 2022-09-14 ENCOUNTER — OFFICE VISIT (OUTPATIENT)
Dept: MATERNAL FETAL MEDICINE | Facility: CLINIC | Age: 27
End: 2022-09-14
Payer: MEDICAID

## 2022-09-14 ENCOUNTER — PROCEDURE VISIT (OUTPATIENT)
Dept: MATERNAL FETAL MEDICINE | Facility: CLINIC | Age: 27
End: 2022-09-14
Payer: COMMERCIAL

## 2022-09-14 VITALS
DIASTOLIC BLOOD PRESSURE: 62 MMHG | SYSTOLIC BLOOD PRESSURE: 118 MMHG | BODY MASS INDEX: 34.95 KG/M2 | WEIGHT: 203.69 LBS

## 2022-09-14 DIAGNOSIS — Z36.89 ENCOUNTER FOR ULTRASOUND TO ASSESS FETAL GROWTH: ICD-10-CM

## 2022-09-14 DIAGNOSIS — Z36.89 ENCOUNTER FOR ULTRASOUND TO ASSESS FETAL GROWTH: Primary | ICD-10-CM

## 2022-09-14 PROCEDURE — 76816 OB US FOLLOW-UP PER FETUS: CPT | Mod: S$GLB,,, | Performed by: OBSTETRICS & GYNECOLOGY

## 2022-09-14 PROCEDURE — 1159F MED LIST DOCD IN RCRD: CPT | Mod: S$GLB,,, | Performed by: OBSTETRICS & GYNECOLOGY

## 2022-09-14 PROCEDURE — 1159F PR MEDICATION LIST DOCUMENTED IN MEDICAL RECORD: ICD-10-PCS | Mod: S$GLB,,, | Performed by: OBSTETRICS & GYNECOLOGY

## 2022-09-14 PROCEDURE — 99213 PR OFFICE/OUTPT VISIT, EST, LEVL III, 20-29 MIN: ICD-10-PCS | Mod: TH,25,S$GLB, | Performed by: OBSTETRICS & GYNECOLOGY

## 2022-09-14 PROCEDURE — 3078F DIAST BP <80 MM HG: CPT | Mod: S$GLB,,, | Performed by: OBSTETRICS & GYNECOLOGY

## 2022-09-14 PROCEDURE — 3044F PR MOST RECENT HEMOGLOBIN A1C LEVEL <7.0%: ICD-10-PCS | Mod: S$GLB,,, | Performed by: OBSTETRICS & GYNECOLOGY

## 2022-09-14 PROCEDURE — 1160F RVW MEDS BY RX/DR IN RCRD: CPT | Mod: S$GLB,,, | Performed by: OBSTETRICS & GYNECOLOGY

## 2022-09-14 PROCEDURE — 1160F PR REVIEW ALL MEDS BY PRESCRIBER/CLIN PHARMACIST DOCUMENTED: ICD-10-PCS | Mod: S$GLB,,, | Performed by: OBSTETRICS & GYNECOLOGY

## 2022-09-14 PROCEDURE — 3074F PR MOST RECENT SYSTOLIC BLOOD PRESSURE < 130 MM HG: ICD-10-PCS | Mod: S$GLB,,, | Performed by: OBSTETRICS & GYNECOLOGY

## 2022-09-14 PROCEDURE — 99213 OFFICE O/P EST LOW 20 MIN: CPT | Mod: TH,25,S$GLB, | Performed by: OBSTETRICS & GYNECOLOGY

## 2022-09-14 PROCEDURE — 76816 PR  US,PREGNANT UTERUS,F/U,TRANSABD APP: ICD-10-PCS | Mod: S$GLB,,, | Performed by: OBSTETRICS & GYNECOLOGY

## 2022-09-14 PROCEDURE — 3044F HG A1C LEVEL LT 7.0%: CPT | Mod: S$GLB,,, | Performed by: OBSTETRICS & GYNECOLOGY

## 2022-09-14 PROCEDURE — 76819 PR US, OB, FETAL BIOPHYSICAL, W/O NST: ICD-10-PCS | Mod: S$GLB,,, | Performed by: OBSTETRICS & GYNECOLOGY

## 2022-09-14 PROCEDURE — 3078F PR MOST RECENT DIASTOLIC BLOOD PRESSURE < 80 MM HG: ICD-10-PCS | Mod: S$GLB,,, | Performed by: OBSTETRICS & GYNECOLOGY

## 2022-09-14 PROCEDURE — 3074F SYST BP LT 130 MM HG: CPT | Mod: S$GLB,,, | Performed by: OBSTETRICS & GYNECOLOGY

## 2022-09-14 PROCEDURE — 3008F PR BODY MASS INDEX (BMI) DOCUMENTED: ICD-10-PCS | Mod: S$GLB,,, | Performed by: OBSTETRICS & GYNECOLOGY

## 2022-09-14 PROCEDURE — 76819 FETAL BIOPHYS PROFIL W/O NST: CPT | Mod: S$GLB,,, | Performed by: OBSTETRICS & GYNECOLOGY

## 2022-09-14 PROCEDURE — 3008F BODY MASS INDEX DOCD: CPT | Mod: S$GLB,,, | Performed by: OBSTETRICS & GYNECOLOGY

## 2022-09-14 NOTE — PROGRESS NOTES
Follow up consultation regarding diabetes in pregnancy provided today.  Risks of uncontrolled diabetes in pregnancy reviewed once again.  Blood glucose measurements assessed.  She remains under very good control with the vast majority of her values in target range.  I did not adjust her medications today.  Her present regimen is 16 units of NovoLog with each meal, 24 units of NPH in the morning and 24 units at bedtime.  The patient was given an opportunity to ask questions about management and the diease process.  She expressed an understanding of and agreement to the above impression and plan. All questions were answered to her satisfaction.  She was given contact information to the [facility] clinic to address further concerns.    I spent a total of 20 minutes on the day of the visit. This includes face to face time and non-face to face time preparing to see the patient (eg, review of tests), Obtaining and/or reviewing separately obtained history, Documenting clinical information in the electronic or other health record, Independently interpreting results and communicating results to the patient/family/caregiver, or Care coordination.

## 2022-09-18 ENCOUNTER — PATIENT MESSAGE (OUTPATIENT)
Dept: MATERNAL FETAL MEDICINE | Facility: CLINIC | Age: 27
End: 2022-09-18
Payer: COMMERCIAL

## 2023-10-26 ENCOUNTER — PATIENT MESSAGE (OUTPATIENT)
Dept: MATERNAL FETAL MEDICINE | Facility: CLINIC | Age: 28
End: 2023-10-26
Payer: COMMERCIAL

## 2023-10-26 ENCOUNTER — TELEPHONE (OUTPATIENT)
Dept: MATERNAL FETAL MEDICINE | Facility: CLINIC | Age: 28
End: 2023-10-26
Payer: COMMERCIAL

## 2023-10-26 DIAGNOSIS — Z36.9 ENCOUNTER FOR FETAL ULTRASOUND: Primary | ICD-10-CM

## 2023-10-26 NOTE — TELEPHONE ENCOUNTER
Pt verified self by name, .   RN called to setup pt with appt.  Pt confirmed that TOSIN Alcantara sent pt to [facility] as the referral was requesting OBGYN services.   RN offered pt soonest avail appt with [facility] at [facility]. Pt declined. Offered pt next  at 0820. Pt cannot come as her job would interfere with appt time/day.   Pt chose appt time/date.   Sent pt bs log to bring with her to appt with [facility]   Pt verbalized understanding. Agreed to POC w intent to comply.

## 2023-11-07 ENCOUNTER — PATIENT MESSAGE (OUTPATIENT)
Dept: MATERNAL FETAL MEDICINE | Facility: CLINIC | Age: 28
End: 2023-11-07
Payer: COMMERCIAL

## 2023-11-08 ENCOUNTER — PROCEDURE VISIT (OUTPATIENT)
Dept: MATERNAL FETAL MEDICINE | Facility: CLINIC | Age: 28
End: 2023-11-08
Payer: MEDICAID

## 2023-11-08 ENCOUNTER — OFFICE VISIT (OUTPATIENT)
Dept: MATERNAL FETAL MEDICINE | Facility: CLINIC | Age: 28
End: 2023-11-08
Payer: MEDICAID

## 2023-11-08 VITALS
SYSTOLIC BLOOD PRESSURE: 122 MMHG | DIASTOLIC BLOOD PRESSURE: 69 MMHG | WEIGHT: 189.13 LBS | HEIGHT: 64 IN | BODY MASS INDEX: 32.29 KG/M2

## 2023-11-08 DIAGNOSIS — Z36.9 ENCOUNTER FOR FETAL ULTRASOUND: ICD-10-CM

## 2023-11-08 DIAGNOSIS — Z36.89 ENCOUNTER FOR FETAL ANATOMIC SURVEY: Primary | ICD-10-CM

## 2023-11-08 DIAGNOSIS — O24.019 PRE-EXISTING TYPE 1 DIABETES MELLITUS DURING PREGNANCY, ANTEPARTUM: Primary | ICD-10-CM

## 2023-11-08 PROCEDURE — 76801 OB US < 14 WKS SINGLE FETUS: CPT | Mod: S$GLB,,, | Performed by: OBSTETRICS & GYNECOLOGY

## 2023-11-08 PROCEDURE — 99499 UNLISTED E&M SERVICE: CPT | Mod: S$GLB,,, | Performed by: OBSTETRICS & GYNECOLOGY

## 2023-11-08 PROCEDURE — 99499 NO LOS: ICD-10-PCS | Mod: S$GLB,,, | Performed by: OBSTETRICS & GYNECOLOGY

## 2023-11-08 PROCEDURE — 99214 PR OFFICE/OUTPT VISIT, EST, LEVL IV, 30-39 MIN: ICD-10-PCS | Mod: TH,S$GLB,, | Performed by: OBSTETRICS & GYNECOLOGY

## 2023-11-08 PROCEDURE — 76801 US MFM PROCEDURE (VIEWPOINT): ICD-10-PCS | Mod: S$GLB,,, | Performed by: OBSTETRICS & GYNECOLOGY

## 2023-11-08 PROCEDURE — 99214 OFFICE O/P EST MOD 30 MIN: CPT | Mod: TH,S$GLB,, | Performed by: OBSTETRICS & GYNECOLOGY

## 2023-11-08 RX ORDER — METFORMIN HYDROCHLORIDE 1000 MG/1
1000 TABLET ORAL 2 TIMES DAILY
COMMUNITY
Start: 2023-02-06

## 2023-11-08 RX ORDER — INSULIN PUMP SYRINGE, 3 ML
EACH MISCELLANEOUS
COMMUNITY
Start: 2023-02-15

## 2023-11-08 RX ORDER — ONDANSETRON 4 MG/1
4 TABLET, ORALLY DISINTEGRATING ORAL
COMMUNITY
Start: 2023-02-06

## 2023-11-08 NOTE — PROGRESS NOTES
"MATERNAL-FETAL MEDICINE   CONSULT NOTE    Provider requesting consultation: Nelia Alcantara NP     SUBJECTIVE:     Ms. Lucy Puckett is a 28 y.o.  female with IUP at 8.6  weeks who is seen in consultation by MFM for evaluation and management of:  Pregestational DM (Type II)          Medication List with Changes/Refills   Current Medications    ASPIRIN (ECOTRIN) 81 MG EC TABLET    Take 81 mg by mouth once daily.    INSULIN ASPART U-100 (NOVOLOG) 100 UNIT/ML INJECTION    Inject 30 Units into the skin 3 (three) times daily. Takes 15 units at lunch    INSULIN NPH (HUMULIN N NPH U-100 INSULIN) 100 UNIT/ML INJECTION    Inject 30 Units into the skin before breakfast AND 17 Units every evening.    INSULIN SYRINGE-NEEDLE,DISPOS. 1 ML 29 GAUGE X 1/2" SYRG    1 Syringe by Misc.(Non-Drug; Combo Route) route 2 (two) times a day.       Review of patient's allergies indicates:  No Known Allergies    PMH:  Past Medical History:   Diagnosis Date    Type 2 diabetes mellitus, without long-term current use of insulin     Unspecified pre-eclampsia, unspecified trimester        PObHx:  OB History    Para Term  AB Living   3 2 1 1   1   SAB IAB Ectopic Multiple Live Births           1      # Outcome Date GA Lbr Andrade/2nd Weight Sex Delivery Anes PTL Lv   3 Current            2  2022 30w0d   F CS-Unspec      1 Term 04/15/19 37w0d   M CS-Unspec   KARMEN      Complications: Fetal Intolerance       PSH:  Past Surgical History:   Procedure Laterality Date     SECTION         Family history:family history is not on file.    Social history: reports that she has never smoked. She has never used smokeless tobacco. She reports that she does not currently use alcohol. She reports that she does not use drugs.    Genetic history:  The patient denies any inherited genetic diseases or birth defects in herself or her partner's personal history or family. FOB mother has some sort of cardiac disease, unclear that it was " "CHD and not CHF. Second infant  of SIDS, ? "Hole in the heart".     Objective:   VS:  122/69    Physical Exam:  deferred     Ultrasound performed. See viewpoint for full ultrasound report.  A lackey living IUP is identified. GEORGES is assigned based on the CRL from todays study.  If other clinical data, such as IVF conception dating or prior ultrasound assignment of GEORGES differs from the GEORGES assigned today, please contact the [facility] department so that this report can be revised to reflect the correct GEORGES.   Uterine myomas are seen as noted above.   The maternal adnexae are normal in appearance.   The amount of free fluid seen in the pelvis is within normal physiologic range.     Significant labs/imaging:  10/17/23 AIC 10.7%     ASSESSMENT/PLAN:     28 y.o.  female with IUP at 8.6 weeks     Pregestational Diabetes    Counseling:   The patient was counseled regarding the risks of diabetes in pregnancy. These risks include but are not limited to an increased risk of , preeclampsia/gestational hypertension, fetal structural anomalies, macrosomia, prematurity, shoulder dystocia/birth injury,  hyperbilirubinemia and electrolyte issues, pulmonary immaturity and sudden stillbirth especially in those patients with poor glucose control. I also discussed with the patient the need for increased fetal surveillance with serial fetal growth assessments and third trimester fetal testing. I also reviewed the possibility of need for early delivery in those with poor glucose control or evidence of fetal growth abnormalities.    Recommendations (Please refer to [facility] guidelines):  Baseline evaluation (to be ordered by primary OB provider):  24 hour urine protein or urine P/C ratio, CBC, CMP  Maternal EKG; echocardiogram if BMI > 30 or EKG is abnormal  Maternal ophthalmic exam (if hasn't been performed in last year) and podiatry exam  Hemoglobin A1C every trimester  Diabetic education referral and counseling " re: goal blood sugars (< 95 mg/dl for fasting, < 120 mg/dl for 2 hour postprandial)  Start/Continue to check blood sugars 4x daily  Fasting and 2-hour postprandial glucose monitoring.   Goals of fasting levels below 95 mg/dL and postprandial levels below 120 mg/dL.   Patient will send BS log weekly through portal on those weeks she is not seen in clinic.   Medications:   Start low dose aspirin 81 mg daily at 12-16 weeks for preeclampsia risk reduction  1 mg folic acid daily  Insulin/Metformin-see below  Multiple dose injectable insulin  Patient's current regimen is:  Metformin 1000 BID   NPH 20 U AM, 10 U PM  Reg 10 U AM, 5 U PM   After consultation, the following adjustments were made: NPH 20 AM/14 PM, Reg 14 AM,5 PM; metformin 1,000 mg BID     Ultrasounds:  Nuchal translucency scan at 12-13 weeks  Targeted anatomy survey at 20 weeks  Serial growth ultrasounds q 4-6 weeks at 26-28 weeks  A fetal echocardiogram is recommended at 22-24 weeks with pediatric cardiology  Prenatal testing  Twice weekly BPP/ NST + AFV starting at 32 weeks. (Should be ordered and arranged by the patient's primary OB provider).    Delivery timin 0/7 to 38 and 6/7 weeks if under good control without comorbidities  37 0/7 to 37 and 6/7 weeks if longstanding diabetes or poorly controlled, polyhydramnios, EFW>90th percentile, or BMI >= 40  36 0/7 to 37 and 6/7 weeks if vascular complications, prior stillbirth or other complicating conditions.  Recommend consideration of earlier delivery if IUGR, HTN, or other complications  Recommend offering  for delivery is EFW is 4500g or more near the time of delivery    Insulin management during labor and delivery  Give usual dose of intermediate acting (NPH) or long-acting insulin at bedtime  Hold morning dose of insulin or reduce to ½ dose   Patients who require insulin should be scheduled as the first available (7 am or 7:30 am)  given the need for NPO status  Check glucose every  2-4 hours in latent labor; hourly in active labor  If blood glucose is less than 70 mg/dl, change IVF to D5 ½ NS at rate of 100-150 cc/hr and monitor blood glucose hourly   IV infusion of regular insulin is recommended if glucose levels exceed 120 mg/dl  Insulin should be reduced by 1/2 of the pre-delivery dose within the first 6-24 hours following delivery.  Patients who are breastfeeding should be advised to have small snacks before breastfeeding to reduce the risk of hypoglycemia.  Patients should be referred to primary MD or Endocrinology for postpartum management.    The patient was advised to call for blood sugars less than 70 or greater than 200 prior to her next appointment. She was also advised that if she notes nausea/vomiting, inability to tolerate PO, or concerns about being able to take her insulin that she should contact her provider or present to the OB ED.      Prior CS X 2: Aware.  Plan repeat CS for delivery.    Prior Infant  from SIDS        FOLLOW UP:   NT at [facility] with MFM MD appointment same day   Fetal echo at 22-4 weeks   Targeted US at 18-20 weeks.   Low dose aspirin at 12 weeks   F/u in 4 weeks for US/MFM visit  To send BS in via Portal in one week, and weekly when not in clinic.  MFM to manage BS/insulin.  Patient leaning toward having cfDNA done with Primary--would not draw this early in gestation.   Prerna Lin MD       The patient location is: [facility] office   The chief complaint leading to consultation is: TII DM  Visit type: audiovisual  Face to Face time with patient: 20  30 minutes of total time spent on the encounter, which includes face to face time and non-face to face time preparing to see the patient (eg, review of tests), Obtaining and/or reviewing separately obtained history, Documenting clinical information in the electronic or other health record, Independently interpreting results (not separately reported) and communicating results to the patient/family/caregiver, or  Care coordination (not separately reported).   Each patient to whom he or she provides medical services by telemedicine is:  (1) informed of the relationship between the physician and patient and the respective role of any other health care provider with respect to management of the patient; and (2) notified that he or she may decline to receive medical services by telemedicine and may withdraw from such care at any time.      Prerna Lin  Maternal-Fetal Medicine    Electronically Signed by Prerna Lin November 8, 2023

## 2023-12-08 ENCOUNTER — PATIENT MESSAGE (OUTPATIENT)
Dept: PEDIATRIC CARDIOLOGY | Facility: CLINIC | Age: 28
End: 2023-12-08
Payer: MEDICAID

## 2023-12-08 ENCOUNTER — TELEPHONE (OUTPATIENT)
Dept: PEDIATRIC CARDIOLOGY | Facility: CLINIC | Age: 28
End: 2023-12-08
Payer: MEDICAID

## 2023-12-08 NOTE — TELEPHONE ENCOUNTER
Call placed to patient in an attempt to schedule her fetal echo appointment. Call answered by voicemail recording. VM left requesting a return call to [phone] to schedule.

## 2023-12-13 ENCOUNTER — OFFICE VISIT (OUTPATIENT)
Dept: MATERNAL FETAL MEDICINE | Facility: CLINIC | Age: 28
End: 2023-12-13
Payer: MEDICAID

## 2023-12-13 ENCOUNTER — PATIENT MESSAGE (OUTPATIENT)
Dept: MATERNAL FETAL MEDICINE | Facility: CLINIC | Age: 28
End: 2023-12-13

## 2023-12-13 ENCOUNTER — PROCEDURE VISIT (OUTPATIENT)
Dept: MATERNAL FETAL MEDICINE | Facility: CLINIC | Age: 28
End: 2023-12-13
Payer: MEDICAID

## 2023-12-13 VITALS
BODY MASS INDEX: 32.83 KG/M2 | SYSTOLIC BLOOD PRESSURE: 121 MMHG | WEIGHT: 191.25 LBS | DIASTOLIC BLOOD PRESSURE: 69 MMHG

## 2023-12-13 DIAGNOSIS — O24.112 PRE-EXISTING TYPE 2 DIABETES MELLITUS DURING PREGNANCY IN SECOND TRIMESTER: Primary | ICD-10-CM

## 2023-12-13 DIAGNOSIS — O34.10 UTERINE FIBROID IN PREGNANCY: ICD-10-CM

## 2023-12-13 DIAGNOSIS — Z36.89 ENCOUNTER FOR FETAL ANATOMIC SURVEY: ICD-10-CM

## 2023-12-13 DIAGNOSIS — D25.9 UTERINE FIBROID IN PREGNANCY: ICD-10-CM

## 2023-12-13 PROCEDURE — 1159F MED LIST DOCD IN RCRD: CPT | Mod: CPTII,S$GLB,, | Performed by: OBSTETRICS & GYNECOLOGY

## 2023-12-13 PROCEDURE — 3046F PR MOST RECENT HEMOGLOBIN A1C LEVEL > 9.0%: ICD-10-PCS | Mod: CPTII,S$GLB,, | Performed by: OBSTETRICS & GYNECOLOGY

## 2023-12-13 PROCEDURE — 1160F RVW MEDS BY RX/DR IN RCRD: CPT | Mod: CPTII,S$GLB,, | Performed by: OBSTETRICS & GYNECOLOGY

## 2023-12-13 PROCEDURE — 3078F DIAST BP <80 MM HG: CPT | Mod: CPTII,S$GLB,, | Performed by: OBSTETRICS & GYNECOLOGY

## 2023-12-13 PROCEDURE — 76801 OB US < 14 WKS SINGLE FETUS: CPT | Mod: S$GLB,,, | Performed by: OBSTETRICS & GYNECOLOGY

## 2023-12-13 PROCEDURE — 3008F PR BODY MASS INDEX (BMI) DOCUMENTED: ICD-10-PCS | Mod: CPTII,S$GLB,, | Performed by: OBSTETRICS & GYNECOLOGY

## 2023-12-13 PROCEDURE — 76801 PR US, OB <14WKS, TRANSABD, SINGLE GESTATION: ICD-10-PCS | Mod: S$GLB,,, | Performed by: OBSTETRICS & GYNECOLOGY

## 2023-12-13 PROCEDURE — 99214 PR OFFICE/OUTPT VISIT, EST, LEVL IV, 30-39 MIN: ICD-10-PCS | Mod: 25,TH,S$GLB, | Performed by: OBSTETRICS & GYNECOLOGY

## 2023-12-13 PROCEDURE — 3008F BODY MASS INDEX DOCD: CPT | Mod: CPTII,S$GLB,, | Performed by: OBSTETRICS & GYNECOLOGY

## 2023-12-13 PROCEDURE — 3074F SYST BP LT 130 MM HG: CPT | Mod: CPTII,S$GLB,, | Performed by: OBSTETRICS & GYNECOLOGY

## 2023-12-13 PROCEDURE — 1160F PR REVIEW ALL MEDS BY PRESCRIBER/CLIN PHARMACIST DOCUMENTED: ICD-10-PCS | Mod: CPTII,S$GLB,, | Performed by: OBSTETRICS & GYNECOLOGY

## 2023-12-13 PROCEDURE — 1159F PR MEDICATION LIST DOCUMENTED IN MEDICAL RECORD: ICD-10-PCS | Mod: CPTII,S$GLB,, | Performed by: OBSTETRICS & GYNECOLOGY

## 2023-12-13 PROCEDURE — 99214 OFFICE O/P EST MOD 30 MIN: CPT | Mod: 25,TH,S$GLB, | Performed by: OBSTETRICS & GYNECOLOGY

## 2023-12-13 PROCEDURE — 3074F PR MOST RECENT SYSTOLIC BLOOD PRESSURE < 130 MM HG: ICD-10-PCS | Mod: CPTII,S$GLB,, | Performed by: OBSTETRICS & GYNECOLOGY

## 2023-12-13 PROCEDURE — 3078F PR MOST RECENT DIASTOLIC BLOOD PRESSURE < 80 MM HG: ICD-10-PCS | Mod: CPTII,S$GLB,, | Performed by: OBSTETRICS & GYNECOLOGY

## 2023-12-13 PROCEDURE — 3046F HEMOGLOBIN A1C LEVEL >9.0%: CPT | Mod: CPTII,S$GLB,, | Performed by: OBSTETRICS & GYNECOLOGY

## 2023-12-13 RX ORDER — FLUCONAZOLE 150 MG/1
TABLET ORAL
COMMUNITY
Start: 2023-11-29

## 2023-12-13 NOTE — PROGRESS NOTES
Follow up consultation regarding diabetes in pregnancy provided today.  Risks of uncontrolled diabetes in pregnancy reviewed once again.  Blood glucose measurements assessed.  I did adjust her medications today.  She will now be taking 26 units of NPH and 16 units of regular in the morning, 5 units of regular at dinner and 20 units of N PH at bedtime  The patient was given an opportunity to ask questions about management and the diease process.  She expressed an understanding of and agreement to the above impression and plan. All questions were answered to her satisfaction.  She was given contact information to the [facility] clinic to address further concerns.

## 2023-12-14 ENCOUNTER — TELEPHONE (OUTPATIENT)
Dept: MATERNAL FETAL MEDICINE | Facility: CLINIC | Age: 28
End: 2023-12-14
Payer: MEDICAID

## 2023-12-14 NOTE — TELEPHONE ENCOUNTER
Pt verified self by name, .   RN called patient to confirm assistance with medicaid transportation services getting set up. Patient should expect a phone call from our facility to assist her with services set up to get to/from future appointments.   Pt expressed her gratitude.   Pt verbalized understanding. Agreed to POC w intent to comply.

## 2024-01-10 ENCOUNTER — PROCEDURE VISIT (OUTPATIENT)
Dept: MATERNAL FETAL MEDICINE | Facility: CLINIC | Age: 29
End: 2024-01-10
Payer: MEDICAID

## 2024-01-10 ENCOUNTER — OFFICE VISIT (OUTPATIENT)
Dept: MATERNAL FETAL MEDICINE | Facility: CLINIC | Age: 29
End: 2024-01-10
Payer: MEDICAID

## 2024-01-10 VITALS
WEIGHT: 187.94 LBS | SYSTOLIC BLOOD PRESSURE: 128 MMHG | DIASTOLIC BLOOD PRESSURE: 73 MMHG | BODY MASS INDEX: 32.26 KG/M2

## 2024-01-10 DIAGNOSIS — Z36.89 ENCOUNTER FOR FETAL ANATOMIC SURVEY: ICD-10-CM

## 2024-01-10 DIAGNOSIS — Z36.89 ENCOUNTER FOR ULTRASOUND TO ASSESS FETAL GROWTH: Primary | ICD-10-CM

## 2024-01-10 DIAGNOSIS — O24.112 PRE-EXISTING TYPE 2 DIABETES MELLITUS DURING PREGNANCY IN SECOND TRIMESTER: Primary | ICD-10-CM

## 2024-01-10 PROCEDURE — 1160F RVW MEDS BY RX/DR IN RCRD: CPT | Mod: CPTII,S$GLB,, | Performed by: OBSTETRICS & GYNECOLOGY

## 2024-01-10 PROCEDURE — 1159F MED LIST DOCD IN RCRD: CPT | Mod: CPTII,S$GLB,, | Performed by: OBSTETRICS & GYNECOLOGY

## 2024-01-10 PROCEDURE — 3074F SYST BP LT 130 MM HG: CPT | Mod: CPTII,S$GLB,, | Performed by: OBSTETRICS & GYNECOLOGY

## 2024-01-10 PROCEDURE — 76811 OB US DETAILED SNGL FETUS: CPT | Mod: S$GLB,,, | Performed by: OBSTETRICS & GYNECOLOGY

## 2024-01-10 PROCEDURE — 99214 OFFICE O/P EST MOD 30 MIN: CPT | Mod: 25,TH,S$GLB, | Performed by: OBSTETRICS & GYNECOLOGY

## 2024-01-10 PROCEDURE — 3008F BODY MASS INDEX DOCD: CPT | Mod: CPTII,S$GLB,, | Performed by: OBSTETRICS & GYNECOLOGY

## 2024-01-10 PROCEDURE — 3078F DIAST BP <80 MM HG: CPT | Mod: CPTII,S$GLB,, | Performed by: OBSTETRICS & GYNECOLOGY

## 2024-01-10 NOTE — PROGRESS NOTES
"Maternal Fetal Medicine follow up consult    SUBJECTIVE:     Lucy Puckett is a 28 y.o.  female with IUP at 17w6d who is seen in follow up consultation by M.  Pregnancy complications include:   No problems updated.    Previous notes reviewed.   No changes to medical, surgical, family, social, or obstetric history.    Interval history since last [facility] visit: Abnormal quad screen for Downs    Medications:  Current Outpatient Medications   Medication Instructions    aspirin (ECOTRIN) 81 mg, Oral, Daily    blood sugar diagnostic (WAVESENSE JAZZ) Strp apply 1 strip by topical route 3-4 times every day    blood-glucose meter (WAVESENSE PRESTO) kit apply 1 strip by topical route 2 times every day    fluconazole (DIFLUCAN) 150 MG Tab Oral    insulin aspart U-100 (NOVOLOG) 30 Units, Subcutaneous, 3 times daily, Takes 15 units at lunch    insulin NPH (HUMULIN N NPH U-100 INSULIN) 100 unit/mL injection Inject 30 Units into the skin before breakfast AND 17 Units every evening.    insulin syringe-needle,dispos. 1 mL 29 gauge x 1/2" Syrg 1 Syringe, Misc.(Non-Drug; Combo Route), 2 times daily    metFORMIN (GLUCOPHAGE) 1,000 mg, Oral, 2 times daily    ondansetron (ZOFRAN-ODT) 4 mg       Care team members:  [facility]       OBJECTIVE:   /73   Wt 85.2 kg (187 lb 15.1 oz)   BMI 32.26 kg/m²         Ultrasound performed. See viewpoint for full ultrasound report.  Anatomy incomplete but normal    Significant labs/imaging:  Downs risk 1:116 on quad screen    ASSESSMENT/PLAN:     28 y.o.  female with IUP at 17w6d      She was referred for an abnormal maternal serwn triple/quad screen indicating an increased risk for Down syndrome (trisomy 21 ). Based on her results, she has a risk of 1/116  for having a baby with Down syndrome. We reviewed that Down syndrome is associated with mental retardation, which can be mild to severe and can be associated with certain structural birth defects. We discussed " the meaning of a karyotype,  chromosomes, trisomy, and other aneuploidies and that most trisomies are associated with structural abnormalities, which may be seen on ultrasound. However, the absence of such findings does not exclude the possibility of a trisomy.    We reviewed the benefits and limitations of maternal serwn triple/quad screening as a screening tool forDown syndrome, neural tube defects, and trisomy 18. High-resolution ultrasound was discussed as a screening test for certain structural birth defects. She expressed understanding that a normal appearing ultrasound does not eliminate the possibility of a chromosomal abnormality. An ultrasound without evidence  of a structural abnormality lowers the risk for chromosomal abnormality by approximately 50% but does not exclude the possibility of an abnormal karyotype.  We discussed prenatal diagnosis of chromosome abnormalities and open neural tube defects by amniocentesis. The benefits, risks, and limitations of these tests were explained. Specifically, we reviewed that amniocentesis has a risk of complications of less than 1/900. These complications can include vaginal bleeding, infection of the uterus, rupture of the membranes, miscarriage or pre-term labor, and damage to the  cord or fetus. Direct ultrasound guidance is used to reduce these risks. Results are generally available within two weeks and are 99.9% accurate.    The family history was unremarkable for birth defects, mental retardation, recurrent pregnancy loss, and other inherited conditions. Consanguinity was denied.  She reported that this pregnancy has been uncomplicated. Smoking, alcohol, medications, recreational drugs and other environmental exposures were denied.    She had a high-resolution ultrasound at the on the day of her visit and the report is attached. The ultrasound measurements obtained were consistent with dating by LMP. The fetal anatomy survey was grossly normal but incomplete  . Targeted ultrasound is limited in detecting fetal karyotypic abnormalities and all forms of fetal congenital abnormalities. An ultrasound without any evidence of a structural abnormality lowers the risk for chromosomal abnormality by approximately 50%, but does not exclude the possibility of an abnormal karyotype. She was informed of the background risk for congenital anomalies of 3% present in all pregnancies.     We also discussed non-invasive prenatal diagnosis(NIPD) for trisomy 21, 18 and 13 through free fetal DNA in maternal serum. Non-invasive prenatal diagnosis poses no fetal risks and is done through an in office blood draw. The majority of circulating cell-free fetal DNA in maternal serum comes from placental cells, therefore a small risk, similar to that seen for CVS results, exists for obtaining a result that may represent confined placental mosaicism.  Non-invasive prenatal diagnosis is available from 10 weeks gestation throughout the remainder of the pregnancy. Typically results are available within 8-10 days and are 99% sensitive and specific for trisomy 21 and trisomy 18. Results are 91 % sensitive and >99% specific for trisomy 13. There is no detailed information about the structure of chromosomes 21, 18 or 13, only the relative copy number. Follow-up diagnostic testing through CVS or amniocentesis is recommended for any abnormal result.  She chose to proceed with NIPD.    In addition we had a follow up consultation regarding diabetes in pregnancy provided today.  Risks of uncontrolled diabetes in pregnancy reviewed once again.  Blood glucose measurements assessed.  Overall she is under good control although her lunch sugars are slightly elevated.  I did adjust her medications today.  She now will be taking 30 units of NPH and 16 units of regular in the morning 5 units of regular with dinner and 16 units of NPH at bedtime along with her 1000 mg of metformin twice daily.    Results of today's  ultrasound discussed with patient.    The patient was given an opportunity to ask questions about management and the diease process.  She expressed an understanding of and agreement to the above impression and plan. All questions were answered to her satisfaction.  She was given contact information to the [facility] clinic to address further concerns.

## 2024-01-12 ENCOUNTER — PATIENT MESSAGE (OUTPATIENT)
Dept: MATERNAL FETAL MEDICINE | Facility: CLINIC | Age: 29
End: 2024-01-12
Payer: COMMERCIAL

## 2024-01-16 DIAGNOSIS — Z36.83 ENCOUNTER FOR FETAL SCREENING FOR CONGENITAL CARDIAC ABNORMALITIES: Primary | ICD-10-CM

## 2024-01-18 ENCOUNTER — TELEPHONE (OUTPATIENT)
Dept: MATERNAL FETAL MEDICINE | Facility: CLINIC | Age: 29
End: 2024-01-18
Payer: COMMERCIAL

## 2024-01-31 ENCOUNTER — TELEPHONE (OUTPATIENT)
Dept: MATERNAL FETAL MEDICINE | Facility: CLINIC | Age: 29
End: 2024-01-31

## 2024-01-31 NOTE — TELEPHONE ENCOUNTER
Pt verified self by name, .   Pt called to confirm she is currently admitted at [facility] this morning. Pt moved her appt to following week.  Pt verbalized understanding. Agreed to POC w intent to comply.

## 2024-02-07 ENCOUNTER — PROCEDURE VISIT (OUTPATIENT)
Dept: MATERNAL FETAL MEDICINE | Facility: CLINIC | Age: 29
End: 2024-02-07
Payer: MEDICAID

## 2024-02-07 ENCOUNTER — OFFICE VISIT (OUTPATIENT)
Dept: MATERNAL FETAL MEDICINE | Facility: CLINIC | Age: 29
End: 2024-02-07
Payer: MEDICAID

## 2024-02-07 VITALS
BODY MASS INDEX: 32.45 KG/M2 | WEIGHT: 189.06 LBS | SYSTOLIC BLOOD PRESSURE: 117 MMHG | DIASTOLIC BLOOD PRESSURE: 63 MMHG

## 2024-02-07 DIAGNOSIS — D25.9 UTERINE FIBROID IN PREGNANCY: ICD-10-CM

## 2024-02-07 DIAGNOSIS — O24.112 PRE-EXISTING TYPE 2 DIABETES MELLITUS DURING PREGNANCY IN SECOND TRIMESTER: Primary | ICD-10-CM

## 2024-02-07 DIAGNOSIS — O34.10 UTERINE FIBROID IN PREGNANCY: ICD-10-CM

## 2024-02-07 DIAGNOSIS — Z36.89 ENCOUNTER FOR FETAL ANATOMIC SURVEY: ICD-10-CM

## 2024-02-07 PROCEDURE — 1159F MED LIST DOCD IN RCRD: CPT | Mod: CPTII,S$GLB,, | Performed by: OBSTETRICS & GYNECOLOGY

## 2024-02-07 PROCEDURE — 3074F SYST BP LT 130 MM HG: CPT | Mod: CPTII,S$GLB,, | Performed by: OBSTETRICS & GYNECOLOGY

## 2024-02-07 PROCEDURE — 99214 OFFICE O/P EST MOD 30 MIN: CPT | Mod: TH,S$GLB,, | Performed by: OBSTETRICS & GYNECOLOGY

## 2024-02-07 PROCEDURE — 76811 OB US DETAILED SNGL FETUS: CPT | Mod: S$GLB,,, | Performed by: OBSTETRICS & GYNECOLOGY

## 2024-02-07 PROCEDURE — 3078F DIAST BP <80 MM HG: CPT | Mod: CPTII,S$GLB,, | Performed by: OBSTETRICS & GYNECOLOGY

## 2024-02-07 PROCEDURE — 3008F BODY MASS INDEX DOCD: CPT | Mod: CPTII,S$GLB,, | Performed by: OBSTETRICS & GYNECOLOGY

## 2024-02-07 RX ORDER — TERCONAZOLE 4 MG/G
1 CREAM VAGINAL NIGHTLY
COMMUNITY
Start: 2024-01-31

## 2024-02-07 RX ORDER — NAPROXEN SODIUM 220 MG
1 TABLET ORAL 3 TIMES DAILY
Qty: 100 EACH | Refills: 12 | Status: SHIPPED | OUTPATIENT
Start: 2024-02-07

## 2024-02-07 NOTE — PROGRESS NOTES
Follow up consultation regarding diabetes in pregnancy provided today.  Risks of uncontrolled diabetes in pregnancy reviewed once again.  Blood glucose measurements assessed.        I did adjust her medications today.  She was taking her nighttime dose as a combined dose at 9:00 p.m..  She now will be on split mixed morning dose of NPH 32 regular 16.  She will take 8 units of regular at dinnertime and 20 units of NPH at bedtime.    The patient was given an opportunity to ask questions about management and the diease process.  She expressed an understanding of and agreement to the above impression and plan. All questions were answered to her satisfaction.  She was given contact information to the [facility] clinic to address further concerns.

## 2024-02-21 ENCOUNTER — PATIENT MESSAGE (OUTPATIENT)
Dept: MATERNAL FETAL MEDICINE | Facility: CLINIC | Age: 29
End: 2024-02-21

## 2024-02-21 ENCOUNTER — OFFICE VISIT (OUTPATIENT)
Dept: MATERNAL FETAL MEDICINE | Facility: CLINIC | Age: 29
End: 2024-02-21
Payer: COMMERCIAL

## 2024-02-21 VITALS
DIASTOLIC BLOOD PRESSURE: 66 MMHG | BODY MASS INDEX: 33.24 KG/M2 | WEIGHT: 193.69 LBS | SYSTOLIC BLOOD PRESSURE: 119 MMHG

## 2024-02-21 DIAGNOSIS — O24.112 PRE-EXISTING TYPE 2 DIABETES MELLITUS DURING PREGNANCY IN SECOND TRIMESTER: ICD-10-CM

## 2024-02-21 DIAGNOSIS — Z36.89 ENCOUNTER FOR ULTRASOUND TO ASSESS FETAL GROWTH: Primary | ICD-10-CM

## 2024-02-21 PROCEDURE — 99214 OFFICE O/P EST MOD 30 MIN: CPT | Mod: TH,S$GLB,, | Performed by: STUDENT IN AN ORGANIZED HEALTH CARE EDUCATION/TRAINING PROGRAM

## 2024-02-21 RX ORDER — INSULIN LISPRO 100 [IU]/ML
6 INJECTION, SOLUTION INTRAVENOUS; SUBCUTANEOUS
COMMUNITY
Start: 2024-02-16

## 2024-02-21 RX ORDER — SYRING-NEEDL,DISP,INSUL,0.3 ML 31GX15/64"
SYRINGE, EMPTY DISPOSABLE MISCELLANEOUS
COMMUNITY
Start: 2024-02-16

## 2024-02-21 RX ORDER — INSULIN DETEMIR 100 [IU]/ML
30 INJECTION, SOLUTION SUBCUTANEOUS 2 TIMES DAILY
COMMUNITY
Start: 2024-02-16 | End: 2024-03-07

## 2024-02-21 RX ORDER — FLUCONAZOLE 150 MG/1
TABLET ORAL
COMMUNITY
Start: 2024-02-07

## 2024-02-21 NOTE — PROGRESS NOTES
Pt reports recent hospital admission for 3 days at [facility] by Dr. Coyne.   Pt states her sugars were high.   Both short and long acting insulins were changed at hospital visit.

## 2024-02-21 NOTE — PROGRESS NOTES
"Maternal Fetal Medicine  Follow Up Consult    SUBJECTIVE:     Lucy Puckett is a 28 y.o.  female with IUP at 23w6d who is seen in follow up consultation by MFM for diabetes management.    Pregnancy complications include:   Problem   Pre-Existing Type 2 Diabetes Mellitus During Pregnancy in Second Trimester       Previous notes reviewed.   No changes to medical, surgical, family, social, or obstetric history.    Interval history since last MFM visit: recent hospitalization for poor glucose control. Patient is feeling better.    Medications:  Current Outpatient Medications   Medication Instructions    aspirin (ECOTRIN) 81 mg, Oral, Daily    BD VEO INSULIN SYRINGE UF 0.3 mL 31 gauge x 15/64" Syrg USE 1 THREE TIMES DAILY BEFORE MEALS WITH HUMALOG AND TWICE DAILY WITH LEVEMIR    blood sugar diagnostic ([employer]) Strp apply 1 strip by topical route 3-4 times every day    blood-glucose meter (WAVESENSE PRESTO) kit apply 1 strip by topical route 2 times every day    fluconazole (DIFLUCAN) 150 MG Tab Oral    fluconazole (DIFLUCAN) 150 MG Tab Oral    insulin aspart U-100 (NOVOLOG) 30 Units, Subcutaneous, 3 times daily, Takes 15 units at lunch    insulin lispro 100 unit/mL injection INJECT SUBCUTANEOUSLY THREE TIMES DAILY PER SLIDNG SCALE 0-150 (NO COVERAGE), 151-200 (2 UNITS), 201-250 (4 UNITS), 251-300 (6 UNITS), 301-350 (8 UNITS), 351-400 (10 UNITS), 401-999 (12 UNITS AND CALL MD) (MAX DAILY DOSE 36 UNITS)    insulin NPH (HUMULIN N NPH U-100 INSULIN) 100 unit/mL injection Inject 30 Units into the skin before breakfast AND 17 Units every evening.    insulin regular 100 unit/mL Inj injection Inject 16 Units into the skin daily with breakfast AND 8 Units daily with dinner or evening meal.    insulin syringe-needle U-100 0.5 mL 31 gauge x 5/16" Syrg 1 Syringe, Misc.(Non-Drug; Combo Route), 3 times daily    insulin syringe-needle,dispos. 1 mL 29 gauge x 1/2" Syrg 1 Syringe, Misc.(Non-Drug; Combo Route), 2 " "times daily    LEVEMIR U-100 INSULIN 30 Units, Subcutaneous, 2 times daily    metFORMIN (GLUCOPHAGE) 1,000 mg, Oral, 2 times daily    ondansetron (ZOFRAN-ODT) 4 mg    terconazole (TERAZOL 7) 0.4 % Crea 1 applicator, Vaginal, Nightly        OBJECTIVE:   /66   Wt 87.9 kg (193 lb 10.8 oz)   BMI 33.24 kg/m²     +FHTs by doppler      Home blood glucose log:  See scan in Media    Pertinent labs/imaging:  Lab Results   Component Value Date    HGBA1C >14.0 (H) 2023     Lab Results   Component Value Date    CREATININE 0.86 2021     No results found for: "WBC", "HGB", "HCT", "MCV", "PLT"    Lab Results   Component Value Date    ALT 22 2021    AST 11 (L) 2021    ALKPHOS 54 2021     No results found for: "UTPCR"      ASSESSMENT/PLAN:     28 y.o.  female with IUP at 23w6d for blood glucose monitoring    Counseling:  I reviewed diet, exercise, blood sugar monitoring, and compliance with regimen with patient today.    Pre-existing type 2 diabetes mellitus during pregnancy in second trimester  --Patient has been previously counseled regarding the risks of diabetes in pregnancy, which we reviewed today.   --Patient has overall had reasonable blood sugar control this pregnancy, but states that she had some dietary slip-ups and started feeling badly, and was admitted - with blood sugar in the 500s.  --The doctor at the hospital changed her insulin regimen to a Lispro sliding scale with meals and Levemir 30u AM/30 u PM. Patient has not been sliding her meals, but rather based on her pre-meal value.  --Logs are overall ok, but with post lunch and dinners in the 120s. Fastings within range.    Today we talked about her new regimen, patient is checking her glucose 8x/day. We simplified the regimen to keep the Levemir as prescribed, but to do Lispro  (no mealtime sliding scale). Patient can still use sliding scale for her 2 hours post-meal glucose check but should record this on her " log so we can adjust her scheduled insulin accordingly.    Recommendations (Please refer to [facility] [place] guidelines):  Baseline evaluation (to be ordered by primary OB provider):  24 hour urine protein or urine P/C ratio, CBC, CMP  Maternal EKG; echocardiogram if BMI > 30 or EKG is abnormal  Maternal ophthalmic exam (if hasn't been performed in last year) and podiatry exam  Hemoglobin A1C every trimester  Diabetic education referral and counseling re: goal blood sugars (< 95 mg/dl for fasting, < 120 mg/dl for 2 hour postprandial)  Start/Continue to check blood sugars 4x daily  Fasting and 2-hour postprandial glucose monitoring.   Goals of fasting levels below 95 mg/dL and postprandial levels below 120 mg/dL.   Medications:   Continue low dose aspirin 81 mg daily for preeclampsia risk reduction  1 mg folic acid daily  Insulin/Metformin-see below  Multiple dose injectable insulin  Patient's new regimen is:  Metformin 1000 mg BID  Levemir 30u AM/ 30u PM  Lispro 6u with breakfast/8u with lunch/8u with dinner  Ultrasounds:  Nuchal translucency scan at 12-13 weeks  Targeted anatomy survey at 20 weeks  Serial growth ultrasounds q 4-6 weeks at 26-28 weeks  A fetal echocardiogram is recommended at 22-24 weeks with pediatric cardiology  Prenatal testing  Twice weekly BPP/ NST + AFV starting at 32 weeks. (Should be ordered and arranged by the patient's primary OB provider).  Delivery timin 0/7 to 38 and 6/7 weeks if under good control without comorbidities  37 0/7 to 37 and 6/7 weeks if longstanding diabetes or poorly controlled, polyhydramnios, EFW>90th percentile, or BMI >= 40  36 0/7 to 37 and 6/7 weeks if vascular complications, prior stillbirth or other complicating conditions.  Recommend consideration of earlier delivery if IUGR, HTN, or other complications  Recommend offering  for delivery is EFW is 4500g or more near the time of delivery  Insulin management during labor and delivery  Give usual dose of  intermediate acting (NPH) or long-acting insulin at bedtime  Hold morning dose of insulin or reduce to ½ dose   Patients who require insulin should be scheduled as the first available (7 am or 7:30 am)  given the need for NPO status  Check glucose every 2-4 hours in latent labor; hourly in active labor  If blood glucose is less than 70 mg/dl, change IVF to D5 ½ NS at rate of 100-150 cc/hr and monitor blood glucose hourly   IV infusion of regular insulin is recommended if glucose levels exceed 120 mg/dl  Postpartum management  Insulin should be reduced by 1/2 of the pre-delivery dose within the first 6-24 hours following delivery.  Patients who were well-controlled on oral regimens can often return to these following delivery.  Patients who are breastfeeding should be advised to have small snacks before breastfeeding to reduce the risk of hypoglycemia.  Patients should be referred to primary MD or Endocrinology for postpartum management.    The patient was advised to call for blood sugars less than 70 or greater than 200 prior to her next appointment. She was also advised that if she notes nausea/vomiting, inability to tolerate PO, or concerns about being able to take her insulin that she should contact her provider or present to the OB ED.    Follow in 1 week for MFM visit    Today I spent 30 minutes in the care of Ms. Puckett. This includes face to face time and non-face to face time preparing to see the patient (eg, review of tests), obtaining and/or reviewing separately obtained history, documenting clinical information in the electronic or other health record, independently interpreting results and communicating results to the patient/family/caregiver, or care coordination.       BERNADETTE Canas MD  Maternal Fetal Medicine

## 2024-02-21 NOTE — ASSESSMENT & PLAN NOTE
--Patient has been previously counseled regarding the risks of diabetes in pregnancy, which we reviewed today.   --Patient has overall had reasonable blood sugar control this pregnancy, but states that she had some dietary slip-ups and started feeling badly, and was admitted 2/13-2/5 with blood sugar in the 500s.  --The doctor at the hospital changed her insulin regimen to a Lispro sliding scale with meals and Levemir 30u AM/30 u PM. Patient has not been sliding her meals, but rather based on her pre-meal value.  --Logs are overall ok, but with post lunch and dinners in the 120s. Fastings within range.    Today we talked about her new regimen, patient is checking her glucose 8x/day. We simplified the regimen to keep the Levemir as prescribed, but to do Lispro 6/8/8 (no mealtime sliding scale). Patient can still use sliding scale for her 2 hours post-meal glucose check but should record this on her log so we can adjust her scheduled insulin accordingly.    Recommendations (Please refer to [facility] [place] guidelines):  Baseline evaluation (to be ordered by primary OB provider):  24 hour urine protein or urine P/C ratio, CBC, CMP  Maternal EKG; echocardiogram if BMI > 30 or EKG is abnormal  Maternal ophthalmic exam (if hasn't been performed in last year) and podiatry exam  Hemoglobin A1C every trimester  Diabetic education referral and counseling re: goal blood sugars (< 95 mg/dl for fasting, < 120 mg/dl for 2 hour postprandial)  Start/Continue to check blood sugars 4x daily  Fasting and 2-hour postprandial glucose monitoring.   Goals of fasting levels below 95 mg/dL and postprandial levels below 120 mg/dL.   Medications:   Continue low dose aspirin 81 mg daily for preeclampsia risk reduction  1 mg folic acid daily  Insulin/Metformin-see below  Multiple dose injectable insulin  Patient's new regimen is:  Metformin 1000 mg BID  Levemir 30u AM/ 30u PM  Lispro 6u with breakfast/8u with lunch/8u with  dinner  Ultrasounds:  Nuchal translucency scan at 12-13 weeks  Targeted anatomy survey at 20 weeks  Serial growth ultrasounds q 4-6 weeks at 26-28 weeks  A fetal echocardiogram is recommended at 22-24 weeks with pediatric cardiology  Prenatal testing  Twice weekly BPP/ NST + AFV starting at 32 weeks. (Should be ordered and arranged by the patient's primary OB provider).  Delivery timin 0/7 to 38 and 6/7 weeks if under good control without comorbidities  37 0/7 to 37 and 6/7 weeks if longstanding diabetes or poorly controlled, polyhydramnios, EFW>90th percentile, or BMI >= 40  36 0/7 to 37 and 6/7 weeks if vascular complications, prior stillbirth or other complicating conditions.  Recommend consideration of earlier delivery if IUGR, HTN, or other complications  Recommend offering  for delivery is EFW is 4500g or more near the time of delivery  Insulin management during labor and delivery  Give usual dose of intermediate acting (NPH) or long-acting insulin at bedtime  Hold morning dose of insulin or reduce to ½ dose   Patients who require insulin should be scheduled as the first available (7 am or 7:30 am)  given the need for NPO status  Check glucose every 2-4 hours in latent labor; hourly in active labor  If blood glucose is less than 70 mg/dl, change IVF to D5 ½ NS at rate of 100-150 cc/hr and monitor blood glucose hourly   IV infusion of regular insulin is recommended if glucose levels exceed 120 mg/dl  Postpartum management  Insulin should be reduced by 1/2 of the pre-delivery dose within the first 6-24 hours following delivery.  Patients who were well-controlled on oral regimens can often return to these following delivery.  Patients who are breastfeeding should be advised to have small snacks before breastfeeding to reduce the risk of hypoglycemia.  Patients should be referred to primary MD or Endocrinology for postpartum management.    The patient was advised to call for blood sugars  less than 70 or greater than 200 prior to her next appointment. She was also advised that if she notes nausea/vomiting, inability to tolerate PO, or concerns about being able to take her insulin that she should contact her provider or present to the OB ED.

## 2024-02-26 ENCOUNTER — OFFICE VISIT (OUTPATIENT)
Dept: MATERNAL FETAL MEDICINE | Facility: CLINIC | Age: 29
End: 2024-02-26
Payer: COMMERCIAL

## 2024-02-26 VITALS
DIASTOLIC BLOOD PRESSURE: 62 MMHG | BODY MASS INDEX: 33.41 KG/M2 | SYSTOLIC BLOOD PRESSURE: 117 MMHG | WEIGHT: 194.69 LBS

## 2024-02-26 DIAGNOSIS — O24.112 PRE-EXISTING TYPE 2 DIABETES MELLITUS DURING PREGNANCY IN SECOND TRIMESTER: Primary | ICD-10-CM

## 2024-02-26 PROCEDURE — 99212 OFFICE O/P EST SF 10 MIN: CPT | Mod: TH,S$GLB,, | Performed by: STUDENT IN AN ORGANIZED HEALTH CARE EDUCATION/TRAINING PROGRAM

## 2024-02-26 NOTE — ASSESSMENT & PLAN NOTE
--Logs reviewed, overall better but still some elevated fastings and post lunch/dinner    New regimen:  Levemir 32u in AM/ 32u in PM  Lispro with meals 6u with breakfast / 10u with lunch / 10u with dinner     Please see prior consult note for full recommendations regarding baseline evaluation, glucose monitoring, and antepartum fetal surveillance.    Fetal ECHO scheduled for this Friday.    The patient was advised to call for blood sugars less than 70 or greater than 200 prior to her next appointment. She was also advised that if she notes nausea/vomiting, inability to tolerate PO, or concerns about being able to take her insulin that she should contact her provider or present to the OB ED.

## 2024-03-01 ENCOUNTER — CLINICAL SUPPORT (OUTPATIENT)
Dept: PEDIATRIC CARDIOLOGY | Facility: CLINIC | Age: 29
End: 2024-03-01
Payer: MEDICAID

## 2024-03-01 ENCOUNTER — OFFICE VISIT (OUTPATIENT)
Dept: PEDIATRIC CARDIOLOGY | Facility: CLINIC | Age: 29
End: 2024-03-01
Payer: MEDICAID

## 2024-03-01 VITALS
HEIGHT: 64 IN | SYSTOLIC BLOOD PRESSURE: 110 MMHG | BODY MASS INDEX: 33.29 KG/M2 | DIASTOLIC BLOOD PRESSURE: 67 MMHG | WEIGHT: 195 LBS | HEART RATE: 97 BPM

## 2024-03-01 DIAGNOSIS — O24.112 PRE-EXISTING TYPE 2 DIABETES MELLITUS DURING PREGNANCY IN SECOND TRIMESTER: Primary | ICD-10-CM

## 2024-03-01 DIAGNOSIS — Z36.83 ENCOUNTER FOR FETAL SCREENING FOR CONGENITAL CARDIAC ABNORMALITIES: ICD-10-CM

## 2024-03-01 PROCEDURE — 3074F SYST BP LT 130 MM HG: CPT | Mod: CPTII,TH,S$GLB, | Performed by: PEDIATRICS

## 2024-03-01 PROCEDURE — 76825 ECHO EXAM OF FETAL HEART: CPT | Mod: S$GLB,,, | Performed by: PEDIATRICS

## 2024-03-01 PROCEDURE — 76827 ECHO EXAM OF FETAL HEART: CPT | Mod: S$GLB,,, | Performed by: PEDIATRICS

## 2024-03-01 PROCEDURE — 93325 DOPPLER ECHO COLOR FLOW MAPG: CPT | Mod: S$GLB,,, | Performed by: PEDIATRICS

## 2024-03-01 PROCEDURE — 3078F DIAST BP <80 MM HG: CPT | Mod: CPTII,TH,S$GLB, | Performed by: PEDIATRICS

## 2024-03-01 PROCEDURE — 1160F RVW MEDS BY RX/DR IN RCRD: CPT | Mod: CPTII,TH,S$GLB, | Performed by: PEDIATRICS

## 2024-03-01 PROCEDURE — 99213 OFFICE O/P EST LOW 20 MIN: CPT | Mod: 25,TH,S$GLB, | Performed by: PEDIATRICS

## 2024-03-01 PROCEDURE — 3008F BODY MASS INDEX DOCD: CPT | Mod: CPTII,TH,S$GLB, | Performed by: PEDIATRICS

## 2024-03-01 PROCEDURE — 1159F MED LIST DOCD IN RCRD: CPT | Mod: CPTII,TH,S$GLB, | Performed by: PEDIATRICS

## 2024-03-01 NOTE — PROGRESS NOTES
"[facility] Pediatric Cardiology Fetal Cardiology Clinic    Today, I had the pleasure of evaluating Lucy Puckett who is now 28 y.o. and carrying her third pregnancy at 25 1/7 weeks gestation with an GEORGES of 24. She was referred for evaluation of the fetal heart due to a history of poorly controlled diabetes mellitus II.  Her previous child  at 6 months of age from SIDS.    She is carrying a female fetus, named Rell.      Obstetric History:    .  Her OB care is by Dr. Ordaz.  Her MFM care is by Dr. Canas.    Past Medical History:   Diagnosis Date    Type 2 diabetes mellitus, without long-term current use of insulin     Unspecified pre-eclampsia, unspecified trimester          Current Outpatient Medications:     aspirin (ECOTRIN) 81 MG EC tablet, Take 81 mg by mouth once daily., Disp: , Rfl:     BD VEO INSULIN SYRINGE UF 0.3 mL 31 gauge x 15/64" Syrg, USE 1 THREE TIMES DAILY BEFORE MEALS WITH HUMALOG AND TWICE DAILY WITH LEVEMIR, Disp: , Rfl:     blood sugar diagnostic ([employer] JAZZ) Strp, apply 1 strip by topical route 3-4 times every day, Disp: , Rfl:     blood-glucose meter ([employer] PRESTO) kit, apply 1 strip by topical route 2 times every day, Disp: , Rfl:     insulin lispro 100 unit/mL injection, Inject 6 Units into the skin 3 (three) times daily with meals. 6 units at breakfast 8 units at lunch 8 units at dinner, Disp: , Rfl:     insulin syringe-needle U-100 0.5 mL 31 gauge x 5/16" Syrg, 1 Syringe by Misc.(Non-Drug; Combo Route) route 3 (three) times daily., Disp: 100 each, Rfl: 12    LEVEMIR U-100 INSULIN 100 unit/mL injection, Inject 30 Units into the skin 2 (two) times a day., Disp: , Rfl:     metFORMIN (GLUCOPHAGE) 1000 MG tablet, Take 1,000 mg by mouth 2 (two) times a day., Disp: , Rfl:     prenatal vit no.124/iron/folic (PRENATAL VITAMIN ORAL), Take 1 tablet by mouth Daily., Disp: , Rfl:     fluconazole (DIFLUCAN) 150 MG Tab, Take by mouth., Disp: , Rfl:     " "fluconazole (DIFLUCAN) 150 MG Tab, Take by mouth., Disp: , Rfl:     insulin syringe-needle,dispos. 1 mL 29 gauge x 1/2" Syrg, 1 Syringe by Misc.(Non-Drug; Combo Route) route 2 (two) times a day., Disp: 100 each, Rfl: 11    ondansetron (ZOFRAN-ODT) 4 MG TbDL, 4 mg., Disp: , Rfl:     terconazole (TERAZOL 7) 0.4 % Crea, Place 1 applicator vaginally every evening., Disp: , Rfl:     Family History: Negative for congenital heart disease, early coronary artery disease, sudden unexplained death, connective tissues disorders, genetic syndromes, multiple miscarriages or other congenital anomalies.    Social History:  is single.      FETAL ECHOCARDIOGRAM (summary):  Fetal echocardiogram at 25 1/7 weeks gestation for a history of poorly controlled diabetes mellitus II. GEORGES 6/13/24. Normally connected heart. Normal sinus rhythm. No ectopy or sustained arrhythmia demonstrated throughout the study. Normal fetal atrial and ductal level shunting. No ventricular level shunting. Normal atrioventricular and semilunar valve structure and function. Normal ductal and aortic arches. Normal biventricular size and systolic function. No pericardial effusion.  (Full report in electronic medical record)    Impression:  Single active female fetus at 25 wga.  Normal fetal echocardiogram.      Todays fetal echocardiogram is normal, within the limitations of fetal echocardiography.  I discussed with her that fetal echocardiography is insufficiently sensitive to rule out all septal defects, anomalies of pulmonary and systemic veins, arch anomalies, and some valvar abnormalities, nor can it ensure that the ductus arteriosus and foramen ovale will spontaneously close.     Recommendations:  Location, timing, and mode of delivery will be determined by the obstetrical team.  She does not require further follow-up in the fetal echocardiography clinic, but I would be happy to see her again if additional questions or concerns arise.    Should " there be any concerns about the baby's heart after birth, a post- echocardiogram and cardiology consultation are recommended.     The above information was discussed in detail including the use of diagrams, with 30 minutes of total face to face time, with greater than 50% with counseling and coordination of care.  The discussion of the diagnosis and treatment options is as described above.      Bobo Sullivan MD, MPH  Pediatric and Fetal Cardiology  [facility]   [address] [place] [id number]    Office: [phone]  Cell: [phone]

## 2024-03-06 ENCOUNTER — PROCEDURE VISIT (OUTPATIENT)
Dept: MATERNAL FETAL MEDICINE | Facility: CLINIC | Age: 29
End: 2024-03-06
Payer: COMMERCIAL

## 2024-03-06 ENCOUNTER — OFFICE VISIT (OUTPATIENT)
Dept: MATERNAL FETAL MEDICINE | Facility: CLINIC | Age: 29
End: 2024-03-06
Payer: COMMERCIAL

## 2024-03-06 VITALS
SYSTOLIC BLOOD PRESSURE: 111 MMHG | BODY MASS INDEX: 33.59 KG/M2 | WEIGHT: 195.75 LBS | DIASTOLIC BLOOD PRESSURE: 65 MMHG

## 2024-03-06 DIAGNOSIS — Z36.89 ENCOUNTER FOR ULTRASOUND TO ASSESS FETAL GROWTH: ICD-10-CM

## 2024-03-06 DIAGNOSIS — Z36.89 ENCOUNTER FOR ULTRASOUND TO ASSESS FETAL GROWTH: Primary | ICD-10-CM

## 2024-03-06 DIAGNOSIS — O24.112 PRE-EXISTING TYPE 2 DIABETES MELLITUS DURING PREGNANCY IN SECOND TRIMESTER: ICD-10-CM

## 2024-03-06 PROCEDURE — 76816 OB US FOLLOW-UP PER FETUS: CPT | Mod: S$GLB,,, | Performed by: OBSTETRICS & GYNECOLOGY

## 2024-03-06 PROCEDURE — 99214 OFFICE O/P EST MOD 30 MIN: CPT | Mod: TH,S$GLB,, | Performed by: OBSTETRICS & GYNECOLOGY

## 2024-03-06 NOTE — PROGRESS NOTES
"Maternal Fetal Medicine follow up consult    SUBJECTIVE:     Lucy Puckett is a 28 y.o.  female with IUP at 25w6d who is seen in follow up consultation by MFM.  Pregnancy complications include:   No problems updated.    Previous notes reviewed.   No changes to medical, surgical, family, social, or obstetric history.    Interval history since last MFM visit: No complaints.  Feels a lot better as her BS is coming into normal range.    Medications:  Current Outpatient Medications   Medication Instructions    aspirin (ECOTRIN) 81 mg, Oral, Daily    BD VEO INSULIN SYRINGE UF 0.3 mL 31 gauge x 15/64" Syrg USE 1 THREE TIMES DAILY BEFORE MEALS WITH HUMALOG AND TWICE DAILY WITH LEVEMIR    blood sugar diagnostic ([employer] JAZZ) Strp apply 1 strip by topical route 3-4 times every day    blood-glucose meter (WAVESENSE PRESTO) kit apply 1 strip by topical route 2 times every day    fluconazole (DIFLUCAN) 150 MG Tab Oral    fluconazole (DIFLUCAN) 150 MG Tab Oral    insulin lispro 6 Units, Subcutaneous, 3 times daily with meals, 6 units at breakfast<BR>10 units at lunch<BR>10 units at dinner    insulin syringe-needle U-100 0.5 mL 31 gauge x 5/16" Syrg 1 Syringe, Misc.(Non-Drug; Combo Route), 3 times daily    insulin syringe-needle,dispos. 1 mL 29 gauge x 1/2" Syrg 1 Syringe, Misc.(Non-Drug; Combo Route), 2 times daily    LEVEMIR U-100 INSULIN 30 Units, Subcutaneous, 2 times daily    metFORMIN (GLUCOPHAGE) 1,000 mg, Oral, 2 times daily    ondansetron (ZOFRAN-ODT) 4 mg    prenatal vit no.124/iron/folic (PRENATAL VITAMIN ORAL) 1 tablet, Oral, Daily    terconazole (TERAZOL 7) 0.4 % Crea 1 applicator, Vaginal, Nightly       Care team members:  ALEX Alcantara NP - Primary OB       OBJECTIVE:   /65   Wt 88.8 kg (195 lb 12.3 oz)   BMI 33.59 kg/m²         Ultrasound performed. See viewpoint for full ultrasound report.  The fetal anatomic survey was completed today, and no fetal structural abnormalities were identified " of the structures imaged today.   Fetal size is AGA with the EFW at the 25% and the AC at the 32%. The EFW is 743 g.  AFV is normal.     Significant labs/imaging:  Fetal echo normal    ASSESSMENT/PLAN:     28 y.o.  female with IUP at 25w6d    No problem-specific Assessment & Plan notes found for this encounter.  Follow up consultation regarding diabetes in pregnancy provided today.  Risks of uncontrolled diabetes in pregnancy reviewed once again.  Blood glucose measurements assessed.  She is under much better control with the majority of her values now being in target range.      I did adjust her medications today.  I increased her lunchtime lispro 12 units.  She now will take 6, 12, 10 at breakfast lunch and dinner.  Her Levemir will remain at 32 in the morning and 32 at bedtime.  I reviewed some of her elevated fastings with her she is taking 2 boxes of reasons as her snack at bedtime.  This is almost 40 g of appear sugar.  I told her to decrease this to 1 box.  We discussed the fact that we would like her intake to be, however, if she is in range with this intake we will not change it.    The patient was given an opportunity to ask questions about management and the diease process.  She expressed an understanding of and agreement to the above impression and plan. All questions were answered to her satisfaction.  She was given contact information to the [facility] clinic to address further concerns.        Results of today's ultrasound discussed with patient.

## 2024-03-07 ENCOUNTER — TELEPHONE (OUTPATIENT)
Dept: MATERNAL FETAL MEDICINE | Facility: CLINIC | Age: 29
End: 2024-03-07
Payer: COMMERCIAL

## 2024-03-07 NOTE — TELEPHONE ENCOUNTER
Pt request for Medication refill.  Pt verified name and   RN called to confirm her current dosage of Levemir and which pharmacy she uses. Pt uses [facility] pharmacy on [facility].    [facility] pharmacy called in with levemir prescription refill and for insulin needs.      
no

## 2024-03-07 NOTE — TELEPHONE ENCOUNTER
[facility] pharmacy called in with levemir prescription refill and for insulin needs at [facility] in [place]. Pt can call to activate for .          ----- Message from Everton Bhandari MA sent at 3/7/2024  8:33 AM CST -----  Patient called to inform me that the insulin she was prescribed, Levemir 100units, has 10 refills on the box. This is her first attempt to refill, and  [facility] in [facility] pharmacy is refusing to refill her insulin stating she has no refills required for that prescription. Ms. Puckett needs her insulin refill today.

## 2024-03-13 ENCOUNTER — TELEPHONE (OUTPATIENT)
Dept: MATERNAL FETAL MEDICINE | Facility: CLINIC | Age: 29
End: 2024-03-13
Payer: COMMERCIAL

## 2024-03-13 NOTE — TELEPHONE ENCOUNTER
Pt called office to notify her pharmacy is not allowing her to  her prescription bc her insurance will not fill her long acting insulin of Levemir.     RN called [facility] pharmacy [place]. RN confirmed with pharmacy and pharmacist notified that there was a 90 day prescription placed but pharmacist has adjusted it to 30 days in which her insurance will cover.     Notified patient that the Levemir prescription has been fixed but it will not be ready until tomorrow as they are waiting for the order to be delivered to store tomorrow.  RN advised patient call pharmacy to confirm when she picks up tomorrow if the medication is available at that time.  Pt verbalized understanding. Agreed to POC w intent to comply.

## 2024-03-14 ENCOUNTER — TELEPHONE (OUTPATIENT)
Dept: MATERNAL FETAL MEDICINE | Facility: CLINIC | Age: 29
End: 2024-03-14
Payer: COMMERCIAL

## 2024-03-14 NOTE — TELEPHONE ENCOUNTER
Pt called because she is unable to get her medication.   Called [facility] pharmacy to find out what the issue was.  Pharmacy staff stated that medicaid has a hold on processing because it reports the pt has another primary insurance carrier  pharmacy staff said pt will need to call medicaid to resolve the issue.  Called pt back to let her know that medicaid is stating she has a primary insurance company that her medication has to be billed thru first  informed pt that she will have to resolve the issue with medicaid  pt verbalized understanding

## 2024-03-20 ENCOUNTER — PATIENT MESSAGE (OUTPATIENT)
Dept: MATERNAL FETAL MEDICINE | Facility: CLINIC | Age: 29
End: 2024-03-20

## 2024-03-20 ENCOUNTER — OFFICE VISIT (OUTPATIENT)
Dept: MATERNAL FETAL MEDICINE | Facility: CLINIC | Age: 29
End: 2024-03-20
Payer: MEDICAID

## 2024-03-20 VITALS
BODY MASS INDEX: 33.25 KG/M2 | DIASTOLIC BLOOD PRESSURE: 78 MMHG | SYSTOLIC BLOOD PRESSURE: 124 MMHG | WEIGHT: 193.81 LBS

## 2024-03-20 DIAGNOSIS — O24.112 PRE-EXISTING TYPE 2 DIABETES MELLITUS DURING PREGNANCY IN SECOND TRIMESTER: Primary | ICD-10-CM

## 2024-03-20 PROCEDURE — 99214 OFFICE O/P EST MOD 30 MIN: CPT | Mod: TH,S$GLB,, | Performed by: STUDENT IN AN ORGANIZED HEALTH CARE EDUCATION/TRAINING PROGRAM

## 2024-03-20 RX ORDER — INSULIN DETEMIR 100 [IU]/ML
32 INJECTION, SOLUTION SUBCUTANEOUS 2 TIMES DAILY
Qty: 19.2 ML | Refills: 11 | Status: SHIPPED | OUTPATIENT
Start: 2024-03-20 | End: 2025-03-20

## 2024-03-20 NOTE — PROGRESS NOTES
"Maternal Fetal Medicine  Follow Up Consult    SUBJECTIVE:       Lucy Puckett is a 28 y.o.  female with IUP at 27w6d who is seen in follow up consultation by MFM for diabetes management.    Pregnancy complications include:   Problem   Pre-Existing Type 2 Diabetes Mellitus During Pregnancy in Second Trimester       Previous notes reviewed.   No changes to medical, surgical, family, social, or obstetric history.    Interval history since last MFM visit: Cannot get Levemir filled because of insurance. Reports feeling poorly due to elevated glucose. Otherwise reports active fetal movement and denies contractions, vaginal bleeding, or leakage of fluid.       Medications:  Current Outpatient Medications   Medication Instructions    aspirin (ECOTRIN) 81 mg, Oral, Daily    BD VEO INSULIN SYRINGE UF 0.3 mL 31 gauge x 15/64" Syrg USE 1 THREE TIMES DAILY BEFORE MEALS WITH HUMALOG AND TWICE DAILY WITH LEVEMIR    blood sugar diagnostic (BView JAZZ) Strp apply 1 strip by topical route 3-4 times every day    blood-glucose meter (WAVESENSE PRESTO) kit apply 1 strip by topical route 2 times every day    fluconazole (DIFLUCAN) 150 MG Tab Oral    fluconazole (DIFLUCAN) 150 MG Tab Oral    insulin lispro 6 Units, Subcutaneous, 3 times daily with meals, 6 units at breakfast<BR>10 units at lunch<BR>10 units at dinner    insulin syringe-needle U-100 0.5 mL 31 gauge x 5/16" Syrg 1 Syringe, Misc.(Non-Drug; Combo Route), 3 times daily    insulin syringe-needle,dispos. 1 mL 29 gauge x 1/2" Syrg 1 Syringe, Misc.(Non-Drug; Combo Route), 2 times daily    LEVEMIR U-100 INSULIN 32 Units, Subcutaneous, 2 times daily    metFORMIN (GLUCOPHAGE) 1,000 mg, Oral, 2 times daily    ondansetron (ZOFRAN-ODT) 4 mg    prenatal vit no.124/iron/folic (PRENATAL VITAMIN ORAL) 1 tablet, Oral, Daily    terconazole (TERAZOL 7) 0.4 % Crea 1 applicator, Vaginal, Nightly        OBJECTIVE:   /78   Wt 87.9 kg (193 lb 12.6 oz)   BMI 33.25 kg/m² " "    Ultrasound performed. See viewpoint for full ultrasound report.    Pertinent labs/imaging:  Lab Results   Component Value Date    HGBA1C >14.0 (H) 2023     Lab Results   Component Value Date    CREATININE 0.86 2021     Lab Results   Component Value Date    WBC 7.6 2024    HGB 11.1 (L) 2024    HCT 33.6 (L) 2024    MCV 88.7 2024     2024       Lab Results   Component Value Date    ALT 22 2021    AST 11 (L) 2021    ALKPHOS 54 2021     No results found for: "UTPCR"      ASSESSMENT/PLAN:     28 y.o.  female with IUP at 27w6d for blood glucose monitoring    Pre-existing type 2 diabetes mellitus during pregnancy in second trimester  Patient did not bring logs in for review today because she has been out of her Levemir for the last two weeks and has been doing a sliding scale with Lispro. Has been unable to refill Levemir because of insurance issues. Reports sugars in the 200s.     3/1/24 normal fetal echo    Current regimen:  Levemir 32u in AM/ 32u in PM  Lispro with meals 6u with breakfast / 12u with lunch / 10u with dinner     Recommendations:  Please see prior consult note for full recommendations regarding baseline evaluation, glucose monitoring, and antepartum fetal surveillance.  Today we spent a significant amount of time calling insurance and pharmacies to get Levemir filled. If patient is unable to get Levemir today will contact primary OB about hospital admission for glucose control.  If patient can get Levemir filled today, will see in 1 week for glucose check.  No changes made to regimen under circumstances.      Follow in 1 weeks for MFM visit  Follow in 3 weeks for US    Today I spent 30 minutes in the care of Ms. Puckett. This includes face to face time and non-face to face time preparing to see the patient (eg, review of tests), obtaining and/or reviewing separately obtained history, documenting clinical information in the " electronic or other health record, independently interpreting results and communicating results to the patient/family/caregiver, or care coordination.       BERNADETTE Canas MD  Maternal Fetal Medicine

## 2024-03-20 NOTE — PROGRESS NOTES
RN called patient at [phone]. Verified self by name   Pt states she did call [facility] and the [facility] pharmacy just received her prescriptions and are currently working on filling it. Pt states she will call back or send us a portal message to confirm that she was able to receive her medications and insulin refill or if there are any further problems with her prescription.  Pt verbalized understanding. Agreed to POC w intent to comply.

## 2024-03-20 NOTE — ASSESSMENT & PLAN NOTE
Patient did not bring logs in for review today because she has been out of her Levemir for the last two weeks and has been doing a sliding scale with Lispro. Has been unable to refill Levemir because of insurance issues. Reports sugars in the 200s.     3/1/24 normal fetal echo    Current regimen:  Levemir 32u in AM/ 32u in PM  Lispro with meals 6u with breakfast / 12u with lunch / 10u with dinner     Recommendations:  Please see prior consult note for full recommendations regarding baseline evaluation, glucose monitoring, and antepartum fetal surveillance.  Today we spent a significant amount of time calling insurance and pharmacies to get Levemir filled. If patient is unable to get Levemir today will contact primary OB about hospital admission for glucose control.  If patient can get Levemir filled today, will see in 1 week for glucose check.  No changes made to regimen under circumstances.

## 2024-03-27 ENCOUNTER — OFFICE VISIT (OUTPATIENT)
Dept: MATERNAL FETAL MEDICINE | Facility: CLINIC | Age: 29
End: 2024-03-27
Payer: MEDICAID

## 2024-03-27 ENCOUNTER — PATIENT MESSAGE (OUTPATIENT)
Dept: MATERNAL FETAL MEDICINE | Facility: CLINIC | Age: 29
End: 2024-03-27

## 2024-03-27 VITALS
SYSTOLIC BLOOD PRESSURE: 125 MMHG | BODY MASS INDEX: 33.51 KG/M2 | DIASTOLIC BLOOD PRESSURE: 71 MMHG | WEIGHT: 195.31 LBS

## 2024-03-27 DIAGNOSIS — O24.112 PRE-EXISTING TYPE 2 DIABETES MELLITUS DURING PREGNANCY IN SECOND TRIMESTER: Primary | ICD-10-CM

## 2024-03-27 PROCEDURE — 1159F MED LIST DOCD IN RCRD: CPT | Mod: CPTII,S$GLB,, | Performed by: OBSTETRICS & GYNECOLOGY

## 2024-03-27 PROCEDURE — 99213 OFFICE O/P EST LOW 20 MIN: CPT | Mod: TH,S$GLB,, | Performed by: OBSTETRICS & GYNECOLOGY

## 2024-03-27 PROCEDURE — 1160F RVW MEDS BY RX/DR IN RCRD: CPT | Mod: CPTII,S$GLB,, | Performed by: OBSTETRICS & GYNECOLOGY

## 2024-03-27 PROCEDURE — 3074F SYST BP LT 130 MM HG: CPT | Mod: CPTII,S$GLB,, | Performed by: OBSTETRICS & GYNECOLOGY

## 2024-03-27 PROCEDURE — 3078F DIAST BP <80 MM HG: CPT | Mod: CPTII,S$GLB,, | Performed by: OBSTETRICS & GYNECOLOGY

## 2024-03-27 PROCEDURE — 3008F BODY MASS INDEX DOCD: CPT | Mod: CPTII,S$GLB,, | Performed by: OBSTETRICS & GYNECOLOGY

## 2024-03-27 RX ORDER — ONDANSETRON 4 MG/1
4 TABLET, ORALLY DISINTEGRATING ORAL
COMMUNITY
Start: 2024-03-20

## 2024-03-27 RX ORDER — METFORMIN HYDROCHLORIDE 1000 MG/1
1000 TABLET ORAL 2 TIMES DAILY
COMMUNITY
Start: 2024-03-20

## 2024-03-27 NOTE — PROGRESS NOTES
Follow up consultation regarding diabetes in pregnancy provided today.  Risks of uncontrolled diabetes in pregnancy reviewed once again.  Blood glucose measurements assessed.      I did not adjust her medications today.  Overall under much better control.  She presently takes 1000 mg of metformin twice daily.  Lispro 6, 12, 10 at breakfast lunch and dinner respectively 32 units of Levemir in the morning and 32 units of Levemir at bedtime.    The patient was given an opportunity to ask questions about management and the diease process.  She expressed an understanding of and agreement to the above impression and plan. All questions were answered to her satisfaction.  She was given contact information to the [facility] clinic to address further concerns.

## 2024-04-04 ENCOUNTER — PROCEDURE VISIT (OUTPATIENT)
Dept: MATERNAL FETAL MEDICINE | Facility: CLINIC | Age: 29
End: 2024-04-04
Payer: MEDICAID

## 2024-04-04 ENCOUNTER — OFFICE VISIT (OUTPATIENT)
Dept: MATERNAL FETAL MEDICINE | Facility: CLINIC | Age: 29
End: 2024-04-04
Payer: MEDICAID

## 2024-04-04 ENCOUNTER — LAB VISIT (OUTPATIENT)
Dept: LAB | Facility: CLINIC | Age: 29
End: 2024-04-04
Payer: MEDICAID

## 2024-04-04 VITALS — WEIGHT: 199.5 LBS | DIASTOLIC BLOOD PRESSURE: 69 MMHG | BODY MASS INDEX: 34.23 KG/M2 | SYSTOLIC BLOOD PRESSURE: 132 MMHG

## 2024-04-04 DIAGNOSIS — Z36.89 ENCOUNTER FOR ULTRASOUND TO ASSESS FETAL GROWTH: Primary | ICD-10-CM

## 2024-04-04 DIAGNOSIS — Z36.89 ENCOUNTER FOR ULTRASOUND TO ASSESS FETAL GROWTH: ICD-10-CM

## 2024-04-04 DIAGNOSIS — O24.112 PRE-EXISTING TYPE 2 DIABETES MELLITUS DURING PREGNANCY IN SECOND TRIMESTER: ICD-10-CM

## 2024-04-04 LAB
ESTIMATED AVG GLUCOSE: 292 MG/DL (ref 68–131)
HBA1C MFR BLD: 11.8 % (ref 4–5.6)

## 2024-04-04 PROCEDURE — 1159F MED LIST DOCD IN RCRD: CPT | Mod: CPTII,S$GLB,, | Performed by: OBSTETRICS & GYNECOLOGY

## 2024-04-04 PROCEDURE — 3078F DIAST BP <80 MM HG: CPT | Mod: CPTII,S$GLB,, | Performed by: OBSTETRICS & GYNECOLOGY

## 2024-04-04 PROCEDURE — 3008F BODY MASS INDEX DOCD: CPT | Mod: CPTII,S$GLB,, | Performed by: OBSTETRICS & GYNECOLOGY

## 2024-04-04 PROCEDURE — 83036 HEMOGLOBIN GLYCOSYLATED A1C: CPT | Performed by: OBSTETRICS & GYNECOLOGY

## 2024-04-04 PROCEDURE — 76816 OB US FOLLOW-UP PER FETUS: CPT | Mod: S$GLB,,, | Performed by: OBSTETRICS & GYNECOLOGY

## 2024-04-04 PROCEDURE — 36415 COLL VENOUS BLD VENIPUNCTURE: CPT | Mod: ,,, | Performed by: OBSTETRICS & GYNECOLOGY

## 2024-04-04 PROCEDURE — 3075F SYST BP GE 130 - 139MM HG: CPT | Mod: CPTII,S$GLB,, | Performed by: OBSTETRICS & GYNECOLOGY

## 2024-04-04 PROCEDURE — 99214 OFFICE O/P EST MOD 30 MIN: CPT | Mod: TH,S$GLB,, | Performed by: OBSTETRICS & GYNECOLOGY

## 2024-04-04 NOTE — PROGRESS NOTES
Follow up consultation regarding diabetes in pregnancy provided today.  Risks of uncontrolled diabetes in pregnancy reviewed once again.  Blood glucose measurements assessed. Her blood sugars after lunch and dinner are beginning to increase.       I did adjust her medications today.  Her lispro was changed to 6, 14, 14 and dinner respectively.  Her Levemir will stay unchanged and breakfast.  She also takes metformin 1000 milligrams b.i.d..    Results of today's ultrasound discussed with patient.    The patient was given an opportunity to ask questions about management and the diease process.  She expressed an understanding of and agreement to the above impression and plan. All questions were answered to her satisfaction.  She was given contact information to the [facility] clinic to address further concerns.

## 2024-04-23 ENCOUNTER — PROCEDURE VISIT (OUTPATIENT)
Dept: MATERNAL FETAL MEDICINE | Facility: CLINIC | Age: 29
End: 2024-04-23
Payer: MEDICAID

## 2024-04-23 ENCOUNTER — OFFICE VISIT (OUTPATIENT)
Dept: MATERNAL FETAL MEDICINE | Facility: CLINIC | Age: 29
End: 2024-04-23
Payer: MEDICAID

## 2024-04-23 VITALS
WEIGHT: 203.25 LBS | SYSTOLIC BLOOD PRESSURE: 121 MMHG | BODY MASS INDEX: 34.87 KG/M2 | DIASTOLIC BLOOD PRESSURE: 66 MMHG

## 2024-04-23 DIAGNOSIS — Z36.89 ENCOUNTER FOR ULTRASOUND TO ASSESS FETAL GROWTH: Primary | ICD-10-CM

## 2024-04-23 DIAGNOSIS — Z36.89 ENCOUNTER FOR ULTRASOUND TO ASSESS FETAL GROWTH: ICD-10-CM

## 2024-04-23 DIAGNOSIS — O24.112 PRE-EXISTING TYPE 2 DIABETES MELLITUS DURING PREGNANCY IN SECOND TRIMESTER: ICD-10-CM

## 2024-04-23 PROCEDURE — 3008F BODY MASS INDEX DOCD: CPT | Mod: CPTII,S$GLB,, | Performed by: OBSTETRICS & GYNECOLOGY

## 2024-04-23 PROCEDURE — 1159F MED LIST DOCD IN RCRD: CPT | Mod: CPTII,S$GLB,, | Performed by: OBSTETRICS & GYNECOLOGY

## 2024-04-23 PROCEDURE — 3074F SYST BP LT 130 MM HG: CPT | Mod: CPTII,S$GLB,, | Performed by: OBSTETRICS & GYNECOLOGY

## 2024-04-23 PROCEDURE — 3046F HEMOGLOBIN A1C LEVEL >9.0%: CPT | Mod: CPTII,S$GLB,, | Performed by: OBSTETRICS & GYNECOLOGY

## 2024-04-23 PROCEDURE — 1160F RVW MEDS BY RX/DR IN RCRD: CPT | Mod: CPTII,S$GLB,, | Performed by: OBSTETRICS & GYNECOLOGY

## 2024-04-23 PROCEDURE — 99214 OFFICE O/P EST MOD 30 MIN: CPT | Mod: TH,S$GLB,, | Performed by: OBSTETRICS & GYNECOLOGY

## 2024-04-23 PROCEDURE — 76819 FETAL BIOPHYS PROFIL W/O NST: CPT | Mod: S$GLB,,, | Performed by: OBSTETRICS & GYNECOLOGY

## 2024-04-23 PROCEDURE — 3078F DIAST BP <80 MM HG: CPT | Mod: CPTII,S$GLB,, | Performed by: OBSTETRICS & GYNECOLOGY

## 2024-04-23 NOTE — PROGRESS NOTES
Follow up consultation regarding diabetes in pregnancy provided today.  Risks of uncontrolled diabetes in pregnancy reviewed once again.  Blood glucose measurements assessed.        I did adjust her medications today.   Her lispro was changed to 6, 16, 14 and dinner respectively.  Her Levemir will stay unchanged and breakfast.  She also takes metformin 1000 milligrams b.i.d..  Note is made of the fact that a hemoglobin A1c obtained April 4th was 11.4 which is completely inconsistent with observed blood sugars on her logs.  She did go through a period of several weeks with no insulin during which time her blood sugars were in the 2-300 range which very well could be influencing this.  We will redraw a hemoglobin A1c on next visit.    Results of today's ultrasound discussed with patient.    The patient was given an opportunity to ask questions about management and the diease process.  She expressed an understanding of and agreement to the above impression and plan. All questions were answered to her satisfaction.  She was given contact information to the [facility] clinic to address further concerns.

## 2024-04-29 ENCOUNTER — PROCEDURE VISIT (OUTPATIENT)
Dept: MATERNAL FETAL MEDICINE | Facility: CLINIC | Age: 29
End: 2024-04-29
Payer: MEDICAID

## 2024-04-29 DIAGNOSIS — Z36.89 ENCOUNTER FOR ULTRASOUND TO ASSESS FETAL GROWTH: ICD-10-CM

## 2024-04-29 PROCEDURE — 76819 FETAL BIOPHYS PROFIL W/O NST: CPT | Mod: S$GLB,,, | Performed by: OBSTETRICS & GYNECOLOGY

## 2024-05-02 ENCOUNTER — PROCEDURE VISIT (OUTPATIENT)
Dept: MATERNAL FETAL MEDICINE | Facility: CLINIC | Age: 29
End: 2024-05-02
Payer: MEDICAID

## 2024-05-02 ENCOUNTER — OFFICE VISIT (OUTPATIENT)
Dept: MATERNAL FETAL MEDICINE | Facility: CLINIC | Age: 29
End: 2024-05-02
Payer: COMMERCIAL

## 2024-05-02 VITALS — SYSTOLIC BLOOD PRESSURE: 128 MMHG | BODY MASS INDEX: 35.25 KG/M2 | DIASTOLIC BLOOD PRESSURE: 78 MMHG | WEIGHT: 205.5 LBS

## 2024-05-02 DIAGNOSIS — O24.112 PRE-EXISTING TYPE 2 DIABETES MELLITUS DURING PREGNANCY IN SECOND TRIMESTER: ICD-10-CM

## 2024-05-02 DIAGNOSIS — Z36.89 ENCOUNTER FOR ULTRASOUND TO ASSESS FETAL GROWTH: ICD-10-CM

## 2024-05-02 DIAGNOSIS — Z36.89 ENCOUNTER FOR ULTRASOUND TO ASSESS FETAL GROWTH: Primary | ICD-10-CM

## 2024-05-02 PROCEDURE — 3046F HEMOGLOBIN A1C LEVEL >9.0%: CPT | Mod: CPTII,S$GLB,, | Performed by: OBSTETRICS & GYNECOLOGY

## 2024-05-02 PROCEDURE — 76816 OB US FOLLOW-UP PER FETUS: CPT | Mod: S$GLB,,, | Performed by: OBSTETRICS & GYNECOLOGY

## 2024-05-02 PROCEDURE — 1160F RVW MEDS BY RX/DR IN RCRD: CPT | Mod: CPTII,S$GLB,, | Performed by: OBSTETRICS & GYNECOLOGY

## 2024-05-02 PROCEDURE — 3008F BODY MASS INDEX DOCD: CPT | Mod: CPTII,S$GLB,, | Performed by: OBSTETRICS & GYNECOLOGY

## 2024-05-02 PROCEDURE — 99214 OFFICE O/P EST MOD 30 MIN: CPT | Mod: TH,S$GLB,, | Performed by: OBSTETRICS & GYNECOLOGY

## 2024-05-02 PROCEDURE — 76819 FETAL BIOPHYS PROFIL W/O NST: CPT | Mod: S$GLB,,, | Performed by: OBSTETRICS & GYNECOLOGY

## 2024-05-02 PROCEDURE — 3074F SYST BP LT 130 MM HG: CPT | Mod: CPTII,S$GLB,, | Performed by: OBSTETRICS & GYNECOLOGY

## 2024-05-02 PROCEDURE — 3078F DIAST BP <80 MM HG: CPT | Mod: CPTII,S$GLB,, | Performed by: OBSTETRICS & GYNECOLOGY

## 2024-05-02 PROCEDURE — 1159F MED LIST DOCD IN RCRD: CPT | Mod: CPTII,S$GLB,, | Performed by: OBSTETRICS & GYNECOLOGY

## 2024-05-02 NOTE — PROGRESS NOTES
Follow up consultation regarding diabetes in pregnancy provided today.  Risks of uncontrolled diabetes in pregnancy reviewed once again.  Blood glucose measurements assessed.        I did adjust her medications today.  I increased her lunch in evening lispro so that she will now be on 32 units of Levemir at bedtime 6 units of lispro with breakfast 18 units with lunch and 18 units at dinner.    Results of today's ultrasound discussed with patient.

## 2024-05-06 ENCOUNTER — PROCEDURE VISIT (OUTPATIENT)
Dept: MATERNAL FETAL MEDICINE | Facility: CLINIC | Age: 29
End: 2024-05-06
Payer: COMMERCIAL

## 2024-05-06 ENCOUNTER — OFFICE VISIT (OUTPATIENT)
Dept: MATERNAL FETAL MEDICINE | Facility: CLINIC | Age: 29
End: 2024-05-06
Payer: COMMERCIAL

## 2024-05-06 VITALS — SYSTOLIC BLOOD PRESSURE: 117 MMHG | DIASTOLIC BLOOD PRESSURE: 63 MMHG | WEIGHT: 205 LBS | BODY MASS INDEX: 35.18 KG/M2

## 2024-05-06 DIAGNOSIS — O24.113 PRE-EXISTING TYPE 2 DIABETES MELLITUS DURING PREGNANCY IN THIRD TRIMESTER: Primary | ICD-10-CM

## 2024-05-06 DIAGNOSIS — Z36.89 ENCOUNTER FOR ULTRASOUND TO ASSESS FETAL GROWTH: ICD-10-CM

## 2024-05-06 PROCEDURE — 76819 FETAL BIOPHYS PROFIL W/O NST: CPT | Mod: S$GLB,,, | Performed by: OBSTETRICS & GYNECOLOGY

## 2024-05-06 PROCEDURE — 99213 OFFICE O/P EST LOW 20 MIN: CPT | Mod: TH,S$GLB,, | Performed by: OBSTETRICS & GYNECOLOGY

## 2024-05-06 NOTE — PROGRESS NOTES
Maternal Fetal Medicine follow up consult    SUBJECTIVE:     Lucy Puckett is a 28 y.o.  female with IUP at 34w4d who is seen in follow up consultation by [facility].  Pregnancy complications include:   No problems updated.    Previous notes reviewed.   No changes to medical, surgical, family, social, or obstetric history.    Interval history since last [facility] visit: Delivery scheduled in 1 week.    Medications reviewed.    Care team members:  Dr. Ordaz - Primary OB       OBJECTIVE:   /63   Wt 93 kg (205 lb 0.4 oz)   BMI 35.18 kg/m²         Ultrasound performed. See viewpoint for full ultrasound report.  THE BPP SCORE IS 8 /8.  Normal amniotic fluid volume      ASSESSMENT/PLAN:     28 y.o.  female with IUP at 34w4d      Follow up consultation regarding diabetes in pregnancy provided today.  Risks of uncontrolled diabetes in pregnancy reviewed once again.  Blood glucose measurements assessed.      I did adjust her medications today.  I increased her dinnertime lispro to 20 units.  She will meal take Cipro 6 units for breakfast 18 units for lunch and 20 units for dinner.  At nighttime we will remain at 32 units.  And she will continue taking her metformin.    Results of today's ultrasound discussed with patient.    The patient was given an opportunity to ask questions about management and the diease process.  She expressed an understanding of and agreement to the above impression and plan. All questions were answered to her satisfaction.  She was given contact information to the [facility] clinic to address further concerns.

## 2024-05-13 ENCOUNTER — PROCEDURE VISIT (OUTPATIENT)
Dept: MATERNAL FETAL MEDICINE | Facility: CLINIC | Age: 29
End: 2024-05-13
Payer: MEDICAID

## 2024-05-13 DIAGNOSIS — Z36.89 ENCOUNTER FOR ULTRASOUND TO ASSESS FETAL GROWTH: ICD-10-CM

## 2024-05-13 DIAGNOSIS — Z36.89 ENCOUNTER FOR ULTRASOUND TO ASSESS FETAL GROWTH: Primary | ICD-10-CM

## 2024-05-13 PROCEDURE — 76819 FETAL BIOPHYS PROFIL W/O NST: CPT | Mod: S$GLB,,, | Performed by: STUDENT IN AN ORGANIZED HEALTH CARE EDUCATION/TRAINING PROGRAM

## 2024-05-17 ENCOUNTER — PROCEDURE VISIT (OUTPATIENT)
Dept: MATERNAL FETAL MEDICINE | Facility: CLINIC | Age: 29
End: 2024-05-17
Payer: MEDICAID

## 2024-05-17 DIAGNOSIS — Z36.89 ENCOUNTER FOR ULTRASOUND TO ASSESS FETAL GROWTH: ICD-10-CM

## 2024-05-17 PROCEDURE — 76819 FETAL BIOPHYS PROFIL W/O NST: CPT | Mod: S$GLB,,, | Performed by: OBSTETRICS & GYNECOLOGY

## 2024-05-20 ENCOUNTER — PROCEDURE VISIT (OUTPATIENT)
Dept: MATERNAL FETAL MEDICINE | Facility: CLINIC | Age: 29
End: 2024-05-20
Payer: MEDICAID

## 2024-05-20 DIAGNOSIS — Z36.89 ENCOUNTER FOR ULTRASOUND TO ASSESS FETAL GROWTH: ICD-10-CM

## 2024-05-20 PROCEDURE — 76819 FETAL BIOPHYS PROFIL W/O NST: CPT | Mod: S$GLB,,, | Performed by: OBSTETRICS & GYNECOLOGY
